# Patient Record
Sex: FEMALE | Race: WHITE | NOT HISPANIC OR LATINO | Employment: FULL TIME | URBAN - METROPOLITAN AREA
[De-identification: names, ages, dates, MRNs, and addresses within clinical notes are randomized per-mention and may not be internally consistent; named-entity substitution may affect disease eponyms.]

---

## 2019-09-10 ENCOUNTER — TRANSCRIBE ORDERS (OUTPATIENT)
Dept: ADMINISTRATIVE | Facility: HOSPITAL | Age: 44
End: 2019-09-10

## 2019-09-10 DIAGNOSIS — Z12.39 BREAST SCREENING: ICD-10-CM

## 2019-09-10 DIAGNOSIS — Z12.39 BREAST SCREENING, UNSPECIFIED: Primary | ICD-10-CM

## 2019-10-14 PROCEDURE — 88305 TISSUE EXAM BY PATHOLOGIST: CPT | Performed by: PATHOLOGY

## 2019-10-16 ENCOUNTER — LAB REQUISITION (OUTPATIENT)
Dept: LAB | Facility: HOSPITAL | Age: 44
End: 2019-10-16
Payer: COMMERCIAL

## 2019-10-16 DIAGNOSIS — N92.1 EXCESSIVE AND FREQUENT MENSTRUATION WITH IRREGULAR CYCLE: ICD-10-CM

## 2019-10-31 PROCEDURE — NC001 PR NO CHARGE: Performed by: OBSTETRICS & GYNECOLOGY

## 2019-11-02 ENCOUNTER — HOSPITAL ENCOUNTER (OUTPATIENT)
Dept: MAMMOGRAPHY | Facility: MEDICAL CENTER | Age: 44
Discharge: HOME | End: 2019-11-02
Payer: COMMERCIAL

## 2019-11-02 VITALS — HEIGHT: 66 IN | WEIGHT: 151 LBS | BODY MASS INDEX: 24.27 KG/M2

## 2019-11-02 DIAGNOSIS — Z12.31 VISIT FOR SCREENING MAMMOGRAM: ICD-10-CM

## 2019-11-02 DIAGNOSIS — Z12.39 BREAST SCREENING: ICD-10-CM

## 2019-11-02 PROCEDURE — 77067 SCR MAMMO BI INCL CAD: CPT

## 2019-11-02 PROCEDURE — 77063 BREAST TOMOSYNTHESIS BI: CPT

## 2019-11-04 RX ORDER — MONTELUKAST SODIUM 10 MG/1
10 TABLET ORAL DAILY
COMMUNITY

## 2019-11-04 RX ORDER — ALBUTEROL SULFATE 90 UG/1
2 AEROSOL, METERED RESPIRATORY (INHALATION) EVERY 6 HOURS PRN
COMMUNITY

## 2019-11-04 RX ORDER — CETIRIZINE HYDROCHLORIDE 10 MG/1
10 TABLET ORAL DAILY
COMMUNITY

## 2019-11-04 RX ORDER — IBUPROFEN 200 MG
200-800 TABLET ORAL EVERY 6 HOURS PRN
COMMUNITY

## 2019-11-04 NOTE — PRE-PROCEDURE INSTRUCTIONS
Pre-Surgery Instructions:   Medication Instructions    albuterol (PROVENTIL HFA,VENTOLIN HFA) 90 mcg/act inhaler Instructed patient per Anesthesia Guidelines   cetirizine (ZyrTEC) 10 mg tablet Instructed patient per Anesthesia Guidelines   ibuprofen (MOTRIN) 200 mg tablet Patient was instructed by Physician and understands   montelukast (SINGULAIR) 10 mg tablet Patient was instructed by Physician and understands  Instructed to use albuterol inhaler am of surgery per anesthesia

## 2019-11-06 ENCOUNTER — ANESTHESIA EVENT (OUTPATIENT)
Dept: PERIOP | Facility: HOSPITAL | Age: 44
End: 2019-11-06
Payer: COMMERCIAL

## 2019-11-06 RX ORDER — SODIUM CHLORIDE, SODIUM LACTATE, POTASSIUM CHLORIDE, CALCIUM CHLORIDE 600; 310; 30; 20 MG/100ML; MG/100ML; MG/100ML; MG/100ML
125 INJECTION, SOLUTION INTRAVENOUS CONTINUOUS
Status: CANCELLED | OUTPATIENT
Start: 2019-11-07

## 2019-11-07 ENCOUNTER — HOSPITAL ENCOUNTER (OUTPATIENT)
Facility: HOSPITAL | Age: 44
Setting detail: OUTPATIENT SURGERY
Discharge: HOME | End: 2019-11-07
Attending: OBSTETRICS & GYNECOLOGY | Admitting: OBSTETRICS & GYNECOLOGY
Payer: COMMERCIAL

## 2019-11-07 ENCOUNTER — ANESTHESIA (OUTPATIENT)
Dept: PERIOP | Facility: HOSPITAL | Age: 44
End: 2019-11-07
Payer: COMMERCIAL

## 2019-11-07 VITALS
WEIGHT: 151 LBS | BODY MASS INDEX: 24.27 KG/M2 | HEART RATE: 68 BPM | HEIGHT: 66 IN | DIASTOLIC BLOOD PRESSURE: 58 MMHG | SYSTOLIC BLOOD PRESSURE: 101 MMHG | OXYGEN SATURATION: 96 % | TEMPERATURE: 97.6 F | RESPIRATION RATE: 18 BRPM

## 2019-11-07 DIAGNOSIS — Z98.890 STATUS POST HYSTEROSCOPY: Primary | ICD-10-CM

## 2019-11-07 DIAGNOSIS — N92.1 EXCESSIVE AND FREQUENT MENSTRUATION WITH IRREGULAR CYCLE: ICD-10-CM

## 2019-11-07 DIAGNOSIS — N84.0 POLYP OF CORPUS UTERI: ICD-10-CM

## 2019-11-07 LAB
BASOPHILS # BLD AUTO: 0.03 THOUSANDS/ΜL (ref 0–0.1)
BASOPHILS NFR BLD AUTO: 1 % (ref 0–1)
EOSINOPHIL # BLD AUTO: 0.08 THOUSAND/ΜL (ref 0–0.61)
EOSINOPHIL NFR BLD AUTO: 1 % (ref 0–6)
ERYTHROCYTE [DISTWIDTH] IN BLOOD BY AUTOMATED COUNT: 12.7 % (ref 11.6–15.1)
EXT PREGNANCY TEST URINE: NEGATIVE
EXT. CONTROL: NORMAL
HCT VFR BLD AUTO: 35.9 % (ref 34.8–46.1)
HGB BLD-MCNC: 11.9 G/DL (ref 11.5–15.4)
IMM GRANULOCYTES # BLD AUTO: 0.02 THOUSAND/UL (ref 0–0.2)
IMM GRANULOCYTES NFR BLD AUTO: 0 % (ref 0–2)
LYMPHOCYTES # BLD AUTO: 1.33 THOUSANDS/ΜL (ref 0.6–4.47)
LYMPHOCYTES NFR BLD AUTO: 24 % (ref 14–44)
MCH RBC QN AUTO: 32 PG (ref 26.8–34.3)
MCHC RBC AUTO-ENTMCNC: 33.1 G/DL (ref 31.4–37.4)
MCV RBC AUTO: 97 FL (ref 82–98)
MONOCYTES # BLD AUTO: 0.4 THOUSAND/ΜL (ref 0.17–1.22)
MONOCYTES NFR BLD AUTO: 7 % (ref 4–12)
NEUTROPHILS # BLD AUTO: 3.78 THOUSANDS/ΜL (ref 1.85–7.62)
NEUTS SEG NFR BLD AUTO: 67 % (ref 43–75)
NRBC BLD AUTO-RTO: 0 /100 WBCS
PLATELET # BLD AUTO: 296 THOUSANDS/UL (ref 149–390)
PMV BLD AUTO: 9.7 FL (ref 8.9–12.7)
RBC # BLD AUTO: 3.72 MILLION/UL (ref 3.81–5.12)
WBC # BLD AUTO: 5.64 THOUSAND/UL (ref 4.31–10.16)

## 2019-11-07 PROCEDURE — NC001 PR NO CHARGE: Performed by: OBSTETRICS & GYNECOLOGY

## 2019-11-07 PROCEDURE — 81025 URINE PREGNANCY TEST: CPT | Performed by: OBSTETRICS & GYNECOLOGY

## 2019-11-07 PROCEDURE — 88305 TISSUE EXAM BY PATHOLOGIST: CPT | Performed by: PATHOLOGY

## 2019-11-07 PROCEDURE — 85025 COMPLETE CBC W/AUTO DIFF WBC: CPT | Performed by: OBSTETRICS & GYNECOLOGY

## 2019-11-07 RX ORDER — MEPERIDINE HYDROCHLORIDE 50 MG/ML
12.5 INJECTION INTRAMUSCULAR; INTRAVENOUS; SUBCUTANEOUS ONCE AS NEEDED
Status: DISCONTINUED | OUTPATIENT
Start: 2019-11-07 | End: 2019-11-07 | Stop reason: HOSPADM

## 2019-11-07 RX ORDER — ACETAMINOPHEN 325 MG/1
650 TABLET ORAL EVERY 6 HOURS PRN
Status: DISCONTINUED | OUTPATIENT
Start: 2019-11-07 | End: 2019-11-07 | Stop reason: HOSPADM

## 2019-11-07 RX ORDER — DEXAMETHASONE SODIUM PHOSPHATE 4 MG/ML
INJECTION, SOLUTION INTRA-ARTICULAR; INTRALESIONAL; INTRAMUSCULAR; INTRAVENOUS; SOFT TISSUE AS NEEDED
Status: DISCONTINUED | OUTPATIENT
Start: 2019-11-07 | End: 2019-11-07 | Stop reason: SURG

## 2019-11-07 RX ORDER — IBUPROFEN 600 MG/1
600 TABLET ORAL EVERY 6 HOURS PRN
Status: DISCONTINUED | OUTPATIENT
Start: 2019-11-07 | End: 2019-11-07 | Stop reason: HOSPADM

## 2019-11-07 RX ORDER — IBUPROFEN 600 MG/1
600 TABLET ORAL EVERY 6 HOURS PRN
Qty: 30 TABLET | Refills: 0
Start: 2019-11-07

## 2019-11-07 RX ORDER — FENTANYL CITRATE/PF 50 MCG/ML
50 SYRINGE (ML) INJECTION
Status: DISCONTINUED | OUTPATIENT
Start: 2019-11-07 | End: 2019-11-07 | Stop reason: HOSPADM

## 2019-11-07 RX ORDER — MAGNESIUM HYDROXIDE 1200 MG/15ML
LIQUID ORAL AS NEEDED
Status: DISCONTINUED | OUTPATIENT
Start: 2019-11-07 | End: 2019-11-07 | Stop reason: HOSPADM

## 2019-11-07 RX ORDER — HYDROMORPHONE HCL/PF 1 MG/ML
0.5 SYRINGE (ML) INJECTION
Status: DISCONTINUED | OUTPATIENT
Start: 2019-11-07 | End: 2019-11-07 | Stop reason: HOSPADM

## 2019-11-07 RX ORDER — ALBUTEROL SULFATE 90 UG/1
2 AEROSOL, METERED RESPIRATORY (INHALATION) EVERY 6 HOURS PRN
Status: DISCONTINUED | OUTPATIENT
Start: 2019-11-07 | End: 2019-11-07 | Stop reason: HOSPADM

## 2019-11-07 RX ORDER — MIDAZOLAM HYDROCHLORIDE 2 MG/2ML
INJECTION, SOLUTION INTRAMUSCULAR; INTRAVENOUS AS NEEDED
Status: DISCONTINUED | OUTPATIENT
Start: 2019-11-07 | End: 2019-11-07 | Stop reason: SURG

## 2019-11-07 RX ORDER — ONDANSETRON 2 MG/ML
4 INJECTION INTRAMUSCULAR; INTRAVENOUS ONCE AS NEEDED
Status: DISCONTINUED | OUTPATIENT
Start: 2019-11-07 | End: 2019-11-07 | Stop reason: HOSPADM

## 2019-11-07 RX ORDER — SODIUM CHLORIDE 9 MG/ML
125 INJECTION, SOLUTION INTRAVENOUS CONTINUOUS
Status: DISCONTINUED | OUTPATIENT
Start: 2019-11-07 | End: 2019-11-07 | Stop reason: HOSPADM

## 2019-11-07 RX ORDER — ACETAMINOPHEN 500 MG
500 TABLET ORAL EVERY 6 HOURS PRN
Qty: 30 TABLET | Refills: 0
Start: 2019-11-07

## 2019-11-07 RX ORDER — ONDANSETRON 2 MG/ML
INJECTION INTRAMUSCULAR; INTRAVENOUS AS NEEDED
Status: DISCONTINUED | OUTPATIENT
Start: 2019-11-07 | End: 2019-11-07 | Stop reason: SURG

## 2019-11-07 RX ORDER — EPHEDRINE SULFATE 50 MG/ML
INJECTION INTRAVENOUS AS NEEDED
Status: DISCONTINUED | OUTPATIENT
Start: 2019-11-07 | End: 2019-11-07 | Stop reason: SURG

## 2019-11-07 RX ORDER — PROPOFOL 10 MG/ML
INJECTION, EMULSION INTRAVENOUS AS NEEDED
Status: DISCONTINUED | OUTPATIENT
Start: 2019-11-07 | End: 2019-11-07 | Stop reason: SURG

## 2019-11-07 RX ORDER — FENTANYL CITRATE 50 UG/ML
INJECTION, SOLUTION INTRAMUSCULAR; INTRAVENOUS AS NEEDED
Status: DISCONTINUED | OUTPATIENT
Start: 2019-11-07 | End: 2019-11-07 | Stop reason: SURG

## 2019-11-07 RX ADMIN — LIDOCAINE HYDROCHLORIDE 50 MG: 20 INJECTION, SOLUTION INTRAVENOUS at 11:00

## 2019-11-07 RX ADMIN — FENTANYL CITRATE 25 MCG: 50 INJECTION, SOLUTION INTRAMUSCULAR; INTRAVENOUS at 11:18

## 2019-11-07 RX ADMIN — SODIUM CHLORIDE 125 ML/HR: 0.9 INJECTION, SOLUTION INTRAVENOUS at 10:35

## 2019-11-07 RX ADMIN — PROPOFOL 200 MG: 10 INJECTION, EMULSION INTRAVENOUS at 11:00

## 2019-11-07 RX ADMIN — MIDAZOLAM 2 MG: 1 INJECTION INTRAMUSCULAR; INTRAVENOUS at 10:49

## 2019-11-07 RX ADMIN — SODIUM CHLORIDE 125 ML/HR: 0.9 INJECTION, SOLUTION INTRAVENOUS at 12:05

## 2019-11-07 RX ADMIN — ONDANSETRON 4 MG: 2 INJECTION INTRAMUSCULAR; INTRAVENOUS at 11:02

## 2019-11-07 RX ADMIN — DEXAMETHASONE SODIUM PHOSPHATE 4 MG: 4 INJECTION, SOLUTION INTRAMUSCULAR; INTRAVENOUS at 11:02

## 2019-11-07 RX ADMIN — FENTANYL CITRATE 25 MCG: 50 INJECTION, SOLUTION INTRAMUSCULAR; INTRAVENOUS at 11:02

## 2019-11-07 RX ADMIN — EPHEDRINE SULFATE 10 MG: 50 INJECTION, SOLUTION INTRAVENOUS at 11:16

## 2019-11-07 NOTE — INTERVAL H&P NOTE
H&P reviewed  After examining the patient I find no changes in the patients condition since the H&P had been written      Vitals:    11/07/19 1012   BP: 110/60   Pulse: 69   Resp: 16   Temp: 98 4 °F (36 9 °C)   SpO2: 98%

## 2019-11-07 NOTE — ANESTHESIA PREPROCEDURE EVALUATION
Review of Systems/Medical History  Patient summary reviewed  Chart reviewed  No history of anesthetic complications     Cardiovascular  Negative cardio ROS    Pulmonary  Asthma , seasonal/exercise induced Asthma type of rescue: PRN inhaler,        GI/Hepatic  Negative GI/hepatic ROS          Negative  ROS        Endo/Other  Negative endo/other ROS      GYN  Negative gynecology ROS          Hematology  Negative hematology ROS      Musculoskeletal  Negative musculoskeletal ROS        Neurology  Negative neurology ROS      Psychology   Negative psychology ROS              Physical Exam    Airway    Mallampati score: I  TM Distance: >3 FB  Neck ROM: full     Dental   Comment: Molar caps,     Cardiovascular  Comment: Negative ROS, Rhythm: regular, Rate: normal, Cardiovascular exam normal    Pulmonary  Pulmonary exam normal Breath sounds clear to auscultation,     Other Findings        Anesthesia Plan  ASA Score- 2     Anesthesia Type- general with ASA Monitors  Additional Monitors:   Airway Plan: LMA  Comment: Patient reports waking up during anal fistulotomy: explained TIVA to her and why she might have woken up  Plan Factors-    Induction- intravenous  Postoperative Plan-     Informed Consent- Anesthetic plan and risks discussed with patient

## 2019-11-07 NOTE — OP NOTE
OPERATIVE REPORT  PATIENT NAME: Eze Herron    :  1975  MRN: [id number]  Pt Location: [facility] 02    SURGERY DATE: 2019    Surgeon(s) and Role:     * Lilliam Coyle,  - Primary     * Brittany Hollingsworth MD - Observing    Preop Diagnosis:  Excessive and frequent menstruation with irregular cycle [N92 1]  Polyp of corpus uteri [N84 0]    Post-Op Diagnosis Codes:     * Excessive and frequent menstruation with irregular cycle [N92 1]     * Polyp of corpus uteri [N84 0]    Procedure(s) (LRB):  D&C, HYSTEROSCOPY W/ RESECTION (N/A)  POLYPECTOMY (N/A)    Specimen(s):  ID Type Source Tests Collected by Time Destination   1 : [facility] w/POLYP Tissue Endometrium TISSUE EXAM Lilliam Coyle DO 2019 1121        Estimated Blood Loss:   20 mL    Hysteroscopy deficit: 230 mL    Anesthesia Type:   General endotracheal    Operative Indications:  Excessive and frequent menstruation with irregular cycle [N92 1]  Polyp of corpus uteri [N84 0]    Operative Findings:  1  A bimanual exam patient had a anteverted mobile uterus with no adnexal masses or fullness palpated  2  Uterus sounded to 8 5 cm  3  On hysteroscopy structurally normal uterus was noted with a 1-2 cm polyp arising from the left anterior fundus also connected to the mid- posterior fundus  4  Bilateral ostia visualized    Complications:   None    Brief History  61-year-old  with abnormal uterine bleeding for D&C hysteroscopy  All risks, benefits, and alternatives to the procedure were discussed with the patient and she had the opportunity to ask questions  Informed consent was obtained  Description of Procedure    Patient was taken to the operating room where correct patient and correct procedure were confirmed  General endotracheal anesthesia (GET) was administered and the patient was positioned on the OR table in the dorsal lithotomy position   All pressure points were padded and a licha hugger was placed to maintain control of core body temperature  A bimanual exam was performed and the uterus was noted to be anteverted, normal in size and consistency with no palpable adnexal masses or fullness  The patient was prepped and draped in the usual sterile fashion  A time out was performed per protocol  Operative Technique    A straight catheter was introduced into the bladder, which was drained of 50cc of clear yellow urine  A weighted speculum was inserted into the vagina and a Andre retractor was used to visualize the anterior lip of the cervix, which was then grasped with a single toothed tenaculum  The uterus was sounded to 8 5 cm  The cervix was serially dilated to 16 using Hanks dilators for introduction of the hysteroscope  Hysteroscope was introduced under direct visualization using normal saline solution as the distention media  Hysteroscope was advanced to the uterine fundus and the entire uterine cavity was inspected in a systematic manner  There was noted to be a 1-2 cm uterine polyp doubly connected from the anterior left fundus to the posterior uterus  The MyoSure device was inserted and the polyp was transected and tissue was sent for pathological evaluation  Hysteroscope was withdrawn and sharp curetting was performed, starting at the 12'oclock position and rotating a total of 360 degrees to cover all surfaces  Endometrial tissue was obtained and sent for pathology  The single toothed tenaculum was removed from the anterior lip of the cervix  Good hemostasis was confirmed at the tenaculum puncture sites  Weighted speculum was then removed from the vagina  At the conclusion of the procedure, all needle, sponge, and instrument counts were noted to be correct x2  Patient tolerated the procedure well and was transferred to PACU in stable condition prior to discharge with follow up in 1-2 weeks  Dr Hanna Corona performed all ayers portions of the case      Patient Disposition:  PACU     SIGNATURE: Santo Acevedo MD  DATE: November 7, 2019  TIME: 11:35 AM     Attending Physician/Surgeon Statement  I was present for and participated in all key surgical aspects of this patient's care  I agree with the resident's documentation as stated above      Lilliam Coyle DO  11/07/19  4:16 PM

## 2019-11-07 NOTE — DISCHARGE INSTRUCTIONS
Hysteroscopy   WHAT YOU NEED TO KNOW:   A hysteroscopy is a procedure to find and treat problems in your uterus  A hysteroscopy may be done to find, and possibly treat, the cause of abnormal vaginal bleeding, problems getting pregnant, or miscarriage  It may also be done to insert or remove a device that prevents pregnancy  DISCHARGE INSTRUCTIONS:   Call 911 if:   · You have trouble breathing  Seek care immediately if:   · You have heavy vaginal bleeding that fills 1 or more sanitary pads in 1 hour  · You have severe pain or bloating in your abdomen  · You feel lightheaded, weak, and confused  · You see blood in your urine  · You stop urinating or urinate less than usual   Contact your healthcare provider if:   · You have a fever or chills  · You have pus or a foul-smelling odor coming from your vagina  · You see blood clots on your sanitary pad that are larger than the size of a quarter  · You have nausea or are vomiting  · Your skin is itchy, swollen, or you have a rash  · You have questions or concerns about your condition or care  Medicines: You may need any of the following:  · Estrogen  helps heal the lining of your uterus  · Antibiotics  help prevent a bacterial infection  · Prescription pain medicine  may be given  Ask your healthcare provider how to take this medicine safely  Some prescription pain medicines contain acetaminophen  Do not take other medicines that contain acetaminophen without talking to your healthcare provider  Too much acetaminophen may cause liver damage  Prescription pain medicine may cause constipation  Ask your healthcare provider how to prevent or treat constipation  · NSAIDs , such as ibuprofen, help decrease swelling, pain, and fever  NSAIDs can cause stomach bleeding or kidney problems in certain people  If you take blood thinner medicine, always ask your healthcare provider if NSAIDs are safe for you   Always read the medicine label and follow directions  · Take your medicine as directed  Contact your healthcare provider if you think your medicine is not helping or if you have side effects  Tell him or her if you are allergic to any medicine  Keep a list of the medicines, vitamins, and herbs you take  Include the amounts, and when and why you take them  Bring the list or the pill bottles to follow-up visits  Carry your medicine list with you in case of an emergency  Self-care:  Do not have sex, use tampons, or douche for 6 weeks  These actions may cause an infection  Ask your healthcare provider if it is okay to take a tub bath or swim  Rest as needed  Do not drive, return to work, or exercise for 24 hours or as directed  You can return to most activities in 1 to 2 days  Follow up with your healthcare provider as directed:  Write down your questions so you remember to ask them during your visits  © 2017 [employer] Information is for End User's use only and may not be sold, redistributed or otherwise used for commercial purposes  All illustrations and images included in CareNotes® are the copyrighted property of A D A M , Inc  or Kenneth [employer]  The above information is an  only  It is not intended as medical advice for individual conditions or treatments  Talk to your doctor, nurse or pharmacist before following any medical regimen to see if it is safe and effective for you  Dilation and Curettage   WHAT YOU SHOULD KNOW:   Dilation and curettage (D and C) is a procedure to remove tissue from the lining of your uterus  AFTER YOU LEAVE:   Medicines:   · Pain medicine: You may be given medicine to take away or decrease pain  Do not wait until the pain is severe before you take your medicine  · Antibiotics: This medicine will help fight or prevent an infection  · Take your medicine as directed    Call your healthcare provider if you think your medicine is not helping or if you have side effects  Tell him if you are allergic to any medicine  Keep a list of the medicines, vitamins, and herbs you take  Include the amounts, and when and why you take them  Bring the list or the pill bottles to follow-up visits  Carry your medicine list with you in case of an emergency  Follow up with your healthcare provider as directed:  Write down your questions so you remember to ask them during your visits  Activity:  Ask your primary healthcare provider when you can return to your normal activities  Contact your primary healthcare provider if:   · You have foul-smelling vaginal discharge  · You do not get your monthly period  · You feel depressed or anxious  · You feel very tired and weak  · You have questions or concerns about your condition or care  Seek care immediately or call 911 if:   · You have heavy vaginal bleeding that soaks 1 pad in 1 hour for 2 hours in a row  · You have a fever and abdominal cramps  · Your pain does not get better, even after treatment  © 2014 [address] Kathy Prasad is for End User's use only and may not be sold, redistributed or otherwise used for commercial purposes  All illustrations and images included in CareNotes® are the copyrighted property of A D [employer] , [employer]  or Kenneth [employer]  The above information is an  only  It is not intended as medical advice for individual conditions or treatments  Talk to your doctor, nurse or pharmacist before following any medical regimen to see if it is safe and effective for you

## 2020-10-07 ENCOUNTER — TRANSCRIBE ORDERS (OUTPATIENT)
Dept: ADMINISTRATIVE | Facility: HOSPITAL | Age: 45
End: 2020-10-07

## 2020-10-07 DIAGNOSIS — Z12.31 ENCOUNTER FOR SCREENING MAMMOGRAM FOR MALIGNANT NEOPLASM OF BREAST: Primary | ICD-10-CM

## 2021-01-16 ENCOUNTER — HOSPITAL ENCOUNTER (OUTPATIENT)
Dept: MAMMOGRAPHY | Facility: MEDICAL CENTER | Age: 46
Discharge: HOME | End: 2021-01-16
Payer: COMMERCIAL

## 2021-01-16 VITALS — BODY MASS INDEX: 24.27 KG/M2 | HEIGHT: 66 IN | WEIGHT: 151 LBS

## 2021-01-16 DIAGNOSIS — Z12.31 ENCOUNTER FOR SCREENING MAMMOGRAM FOR MALIGNANT NEOPLASM OF BREAST: ICD-10-CM

## 2021-01-16 PROCEDURE — 77067 SCR MAMMO BI INCL CAD: CPT

## 2021-01-16 PROCEDURE — 77063 BREAST TOMOSYNTHESIS BI: CPT

## 2021-12-26 ENCOUNTER — OFFICE VISIT (OUTPATIENT)
Dept: URGENT CARE | Age: 46
End: 2021-12-26
Payer: COMMERCIAL

## 2021-12-26 VITALS
HEART RATE: 99 BPM | WEIGHT: 155 LBS | BODY MASS INDEX: 25.83 KG/M2 | TEMPERATURE: 98.1 F | OXYGEN SATURATION: 97 % | RESPIRATION RATE: 20 BRPM | HEIGHT: 65 IN

## 2021-12-26 DIAGNOSIS — B34.9 VIRAL SYNDROME: Primary | ICD-10-CM

## 2021-12-26 PROCEDURE — 87636 SARSCOV2 & INF A&B AMP PRB: CPT

## 2021-12-26 PROCEDURE — 99213 OFFICE O/P EST LOW 20 MIN: CPT

## 2021-12-27 LAB
FLUAV RNA RESP QL NAA+PROBE: NEGATIVE
FLUBV RNA RESP QL NAA+PROBE: NEGATIVE
SARS-COV-2 RNA RESP QL NAA+PROBE: POSITIVE

## 2022-01-22 ENCOUNTER — HOSPITAL ENCOUNTER (OUTPATIENT)
Dept: MAMMOGRAPHY | Facility: MEDICAL CENTER | Age: 47
Discharge: HOME | End: 2022-01-22
Payer: COMMERCIAL

## 2022-01-22 VITALS — HEIGHT: 65 IN | WEIGHT: 155 LBS | BODY MASS INDEX: 25.83 KG/M2

## 2022-01-22 DIAGNOSIS — Z12.31 ENCOUNTER FOR SCREENING MAMMOGRAM FOR MALIGNANT NEOPLASM OF BREAST: ICD-10-CM

## 2022-01-22 PROCEDURE — 77063 BREAST TOMOSYNTHESIS BI: CPT

## 2022-01-22 PROCEDURE — 77067 SCR MAMMO BI INCL CAD: CPT

## 2023-06-13 ENCOUNTER — HOSPITAL ENCOUNTER (OUTPATIENT)
Dept: RADIOLOGY | Facility: IMAGING CENTER | Age: 48
Discharge: HOME | End: 2023-06-13
Payer: COMMERCIAL

## 2023-06-13 VITALS — BODY MASS INDEX: 24.17 KG/M2 | WEIGHT: 145.06 LBS | HEIGHT: 65 IN

## 2023-06-13 DIAGNOSIS — Z12.31 SCREENING MAMMOGRAM FOR HIGH-RISK PATIENT: ICD-10-CM

## 2023-06-13 PROCEDURE — 77067 SCR MAMMO BI INCL CAD: CPT

## 2023-06-13 PROCEDURE — 77063 BREAST TOMOSYNTHESIS BI: CPT

## 2023-06-26 LAB — EXTERNAL COLOGUARD RESULT: NEGATIVE

## 2023-07-07 ENCOUNTER — HOSPITAL ENCOUNTER (OUTPATIENT)
Dept: ULTRASOUND IMAGING | Facility: CLINIC | Age: 48
Discharge: HOME | End: 2023-07-07
Payer: COMMERCIAL

## 2023-07-07 VITALS — BODY MASS INDEX: 24.16 KG/M2 | WEIGHT: 145 LBS | HEIGHT: 65 IN

## 2023-07-07 DIAGNOSIS — R92.2 INCONCLUSIVE MAMMOGRAM: ICD-10-CM

## 2023-07-07 PROCEDURE — 76641 ULTRASOUND BREAST COMPLETE: CPT

## 2023-07-11 ENCOUNTER — HOSPITAL ENCOUNTER (OUTPATIENT)
Dept: ULTRASOUND IMAGING | Facility: CLINIC | Age: 48
Discharge: HOME | End: 2023-07-11
Payer: COMMERCIAL

## 2023-07-11 DIAGNOSIS — R92.8 ABNORMAL ULTRASOUND OF BREAST: ICD-10-CM

## 2023-07-11 PROCEDURE — 76642 ULTRASOUND BREAST LIMITED: CPT

## 2023-09-05 ENCOUNTER — RA CDI HCC (OUTPATIENT)
Dept: OTHER | Facility: HOSPITAL | Age: 48
End: 2023-09-05

## 2023-09-05 NOTE — PROGRESS NOTES
[facility] [address] [facility] coding opportunities       Chart reviewed, no opportunity found: CHART REVIEWED, NO OPPORTUNITY FOUND        Patients Insurance        Commercial Insurance: Ruiz Collado

## 2023-09-11 ENCOUNTER — OFFICE VISIT (OUTPATIENT)
Age: 48
End: 2023-09-11
Payer: COMMERCIAL

## 2023-09-11 VITALS
TEMPERATURE: 98.2 F | SYSTOLIC BLOOD PRESSURE: 130 MMHG | OXYGEN SATURATION: 98 % | HEIGHT: 64 IN | HEART RATE: 67 BPM | BODY MASS INDEX: 25.47 KG/M2 | WEIGHT: 149.2 LBS | DIASTOLIC BLOOD PRESSURE: 78 MMHG

## 2023-09-11 DIAGNOSIS — Z76.89 ENCOUNTER TO ESTABLISH CARE: ICD-10-CM

## 2023-09-11 DIAGNOSIS — Z12.12 ENCOUNTER FOR COLORECTAL CANCER SCREENING: Primary | ICD-10-CM

## 2023-09-11 DIAGNOSIS — Z13.228 SCREENING FOR METABOLIC DISORDER: ICD-10-CM

## 2023-09-11 DIAGNOSIS — Z12.11 SCREENING FOR COLON CANCER: ICD-10-CM

## 2023-09-11 DIAGNOSIS — Z12.11 ENCOUNTER FOR COLORECTAL CANCER SCREENING: Primary | ICD-10-CM

## 2023-09-11 PROCEDURE — 99203 OFFICE O/P NEW LOW 30 MIN: CPT | Performed by: NURSE PRACTITIONER

## 2023-09-11 RX ORDER — LANOLIN ALCOHOL/MO/W.PET/CERES
1000 CREAM (GRAM) TOPICAL
COMMUNITY

## 2023-09-11 RX ORDER — MELATONIN
1000 DAILY
COMMUNITY

## 2023-09-11 NOTE — PROGRESS NOTES
Assessment/Plan:    Encounter to establish care  - Up-to-date with colon, cervical and breast cancer screening  -Fasting blood work completed in March 2023, repeat blood work in 1 year with annual physical  -Continue with well-balanced diet, continue daily exercise              Diagnoses and all orders for this visit:    Encounter for colorectal cancer screening  -     Ambulatory Referral to Gastroenterology; Future    Screening for colon cancer  -     Ambulatory Referral to Gastroenterology; Future    Screening for metabolic disorder  -     CBC and differential  -     Comprehensive metabolic panel; Future  -     Lipid panel    Encounter to establish care    Other orders  -     vitamin B-12 (VITAMIN B-12) 1,000 mcg tablet; Take 1,000 mcg by mouth  -     Omega 3-6-9 Fatty Acids (OMEGA 3-6-9 COMPLEX PO); Take by mouth in the morning  -     cholecalciferol (VITAMIN D3) 1,000 units tablet; Take 1,000 Units by mouth daily          Subjective:      Patient ID: Arturo Busch is a 41 y.o. female. Patient presents today to establish care with our practice. She denies any new concerns   She is a      She currently eats a plant based diet  She currently takes B12, vitamin D and fish oil     Exercise- walks every day, workouts out 3 times a week    Tobacco abuse- former smoker, quit in 2000  Alcohol abuse-social drinking     Mammogram- 2023   Cervical Ca screening-2019      The following portions of the patient's history were reviewed and updated as appropriate: allergies, current medications, past family history, past medical history, past social history, past surgical history and problem list.    Review of Systems   Constitutional: Negative for activity change, appetite change, chills, diaphoresis and fever. HENT: Negative for congestion, ear discharge, ear pain, postnasal drip, rhinorrhea, sinus pressure, sinus pain and sore throat. Eyes: Negative for pain, discharge, itching and visual disturbance. Respiratory: Negative for cough, chest tightness, shortness of breath and wheezing. Cardiovascular: Negative for chest pain, palpitations and leg swelling. Gastrointestinal: Negative for abdominal pain, constipation, diarrhea, nausea and vomiting. Endocrine: Negative for polydipsia, polyphagia and polyuria. Genitourinary: Negative for difficulty urinating, dysuria and urgency. Musculoskeletal: Negative for arthralgias, back pain and neck pain. Skin: Negative for rash and wound. Neurological: Negative for dizziness, weakness, numbness and headaches. Past Medical History:   Diagnosis Date   • Allergic    • Anesthesia complication     believes awoke during procedure   • Excessive and frequent menstruation    • Exercise-induced asthma    • Polyp of corpus uteri    • Post concussion syndrome     doing vision therapy   • Seasonal allergies          Current Outpatient Medications:   •  albuterol (PROVENTIL HFA,VENTOLIN HFA) 90 mcg/act inhaler, Inhale 2 puffs every 6 (six) hours as needed for wheezing, Disp: , Rfl:   •  cholecalciferol (VITAMIN D3) 1,000 units tablet, Take 1,000 Units by mouth daily, Disp: , Rfl:   •  ibuprofen (MOTRIN) 200 mg tablet, Take 200-800 mg by mouth every 6 (six) hours as needed for mild pain, Disp: , Rfl:   •  Omega 3-6-9 Fatty Acids (OMEGA 3-6-9 COMPLEX PO), Take by mouth in the morning, Disp: , Rfl:   •  vitamin B-12 (VITAMIN B-12) 1,000 mcg tablet, Take 1,000 mcg by mouth, Disp: , Rfl:     Allergies   Allergen Reactions   • Other Wheezing       Social History   Past Surgical History:   Procedure Laterality Date   • DILATION AND CURETTAGE OF UTERUS     • POLYPECTOMY N/A 11/7/2019    Procedure: POLYPECTOMY;  Surgeon: Lilliam Coyle DO;  Location: [facility];  Service: Gynecology   • MA HYSTEROSCOPY BX ENDOMETRIUM&/POLYPC W/WO D&C N/A 11/7/2019    Procedure: D&C, HYSTEROSCOPY W/ RESECTION;  Surgeon:  Lilliam Coyle DO;  Location: [facility];  Service: Gynecology • TREATMENT FISTULA ANAL     • WISDOM TOOTH EXTRACTION       Family History   Problem Relation Age of Onset   • No Known Problems Mother    • Lymphoma Father    • Brain cancer Maternal Grandmother 48   • No Known Problems Maternal Grandfather    • Breast cancer Paternal Grandmother 80   • Melanoma Paternal Grandfather    • No Known Problems Brother    • No Known Problems Son    • No Known Problems Son    • No Known Problems Maternal Aunt    • No Known Problems Maternal Aunt    • Brain cancer Paternal Aunt    • Breast cancer Paternal Aunt 64       Objective:  /78 (BP Location: Right arm, Patient Position: Sitting, Cuff Size: Standard)   Pulse 67   Temp 98.2 °F (36.8 °C) (Temporal)   Ht 5' 4.37" (1.635 m)   Wt 67.7 kg (149 lb 3.2 oz)   SpO2 98%   BMI 25.32 kg/m²     No results found for this or any previous visit (from the past 1344 hour(s)). Physical Exam  Constitutional:       General: She is not in acute distress. Appearance: She is well-developed. She is not diaphoretic. HENT:      Head: Normocephalic and atraumatic. Right Ear: External ear normal.      Left Ear: External ear normal.      Nose: Nose normal.      Mouth/Throat:      Pharynx: No oropharyngeal exudate. Eyes:      General:         Right eye: No discharge. Left eye: No discharge. Conjunctiva/sclera: Conjunctivae normal.      Pupils: Pupils are equal, round, and reactive to light. Neck:      Thyroid: No thyromegaly. Cardiovascular:      Rate and Rhythm: Normal rate and regular rhythm. Heart sounds: Normal heart sounds. No murmur heard. No friction rub. No gallop. Pulmonary:      Effort: Pulmonary effort is normal. No respiratory distress. Breath sounds: Normal breath sounds. No stridor. No wheezing or rales. Abdominal:      General: Bowel sounds are normal. There is no distension. Palpations: Abdomen is soft. Tenderness: There is no abdominal tenderness.    Musculoskeletal: Cervical back: Normal range of motion and neck supple. Lymphadenopathy:      Cervical: No cervical adenopathy. Skin:     General: Skin is warm and dry. Findings: No erythema or rash. Neurological:      Mental Status: She is alert and oriented to person, place, and time. Psychiatric:         Behavior: Behavior normal.         Thought Content:  Thought content normal.         Judgment: Judgment normal.

## 2023-09-11 NOTE — ASSESSMENT & PLAN NOTE
- Up-to-date with colon, cervical and breast cancer screening  -Fasting blood work completed in March 2023, repeat blood work in 1 year with annual physical  -Continue with well-balanced diet, continue daily exercise

## 2023-10-31 ENCOUNTER — AMB VIDEO VISIT (OUTPATIENT)
Dept: OTHER | Facility: HOSPITAL | Age: 48
End: 2023-10-31

## 2023-10-31 DIAGNOSIS — J06.9 ACUTE URI: Primary | ICD-10-CM

## 2023-10-31 PROBLEM — Z76.89 ENCOUNTER TO ESTABLISH CARE: Status: RESOLVED | Noted: 2023-09-11 | Resolved: 2023-10-31

## 2023-10-31 PROCEDURE — ECARE PR SL URGENT CARE VIRTUAL VISIT: Performed by: PHYSICIAN ASSISTANT

## 2023-10-31 RX ORDER — GUAIFENESIN, PSEUDOEPHEDRINE HYDROCHLORIDE 600; 60 MG/1; MG/1
1 TABLET, EXTENDED RELEASE ORAL EVERY 12 HOURS
Qty: 20 TABLET | Refills: 0 | Status: SHIPPED | OUTPATIENT
Start: 2023-10-31 | End: 2023-10-31 | Stop reason: SDUPTHER

## 2023-10-31 RX ORDER — METHYLPREDNISOLONE 4 MG/1
TABLET ORAL
Qty: 21 TABLET | Refills: 0 | Status: SHIPPED | OUTPATIENT
Start: 2023-10-31

## 2023-10-31 RX ORDER — GUAIFENESIN, PSEUDOEPHEDRINE HYDROCHLORIDE 600; 60 MG/1; MG/1
1 TABLET, EXTENDED RELEASE ORAL EVERY 12 HOURS
Qty: 20 TABLET | Refills: 0 | Status: SHIPPED | OUTPATIENT
Start: 2023-10-31

## 2023-10-31 RX ORDER — METHYLPREDNISOLONE 4 MG/1
TABLET ORAL
Qty: 21 TABLET | Refills: 0 | Status: SHIPPED | OUTPATIENT
Start: 2023-10-31 | End: 2023-10-31 | Stop reason: SDUPTHER

## 2023-10-31 NOTE — CARE ANYWHERE EVISITS
Visit Summary for Jordyn Herrera - Gender: Female - Date of Birth: [id number]  Date: [id number] - Duration: 18 minutes  Patient: Jordyn Herrera  Provider: Lizbeth Aguilar PA-C    Patient Contact Information  Address  [place] [facility] [address]; [place]  [phone]    Visit Topics  CongestiÃ³n moving to chest [Added By: Self - 2023-10-31]  Cold [Added By: Self - 2023-10-31]  Headache [Added By: Self - [id number]]    Triage Questions   What is your current physical address in the event of a medical emergency? Answer []  Are you allergic to any medications? Answer []  Are you now or could you be pregnant? Answer []  Do you have any immune system compromise or chronic lung   disease? Answer []  Do you have any vulnerable family members in the home (infant, pregnant, cancer, elderly)? Answer []     Conversation Transcripts  [0A][0A] [Notification] You are connected with Lizbeth Aguilar PA-C, Urgent Care Specialist.[0A][Notification] Raj Wright is located in [place]. [0A][Notification] Raj Wright has shared health history. Villasenor Redtawnya .[0A]    Diagnosis  Acute upper respiratory infection, unspecified    Procedures  Value: [id number] Code: CPT-4 UNLISTED E&M SERVICE    Medications Prescribed    Cyclafem 1/35 (28)      Frequency :             Electronically signed by: Quincy Hamman, Shannon(NPI [id number])

## 2023-10-31 NOTE — LETTER
October 31, 2023     Patient: Ania Main   YOB: 1975   Date of Visit: 10/31/2023       To Whom it May Concern: Ania Main is under my professional care. She was seen virtually on 10/31/2023. She may return to work on 11/2/23, or sooner if feeling well enough . If you have any questions or concerns, please don't hesitate to call.          Sincerely,          Cabrera Almaraz PA-C        CC: No Recipients

## 2023-10-31 NOTE — PATIENT INSTRUCTIONS
Schedule a follow-up appointment with your primary care physician for recheck in 2-3 days. If you cannot see your PCP, you can schedule a follow up appointment at a [facility]. Go to the emergency department if you develop any new or worsening symptoms including shortness of breath, chest pain, or anything else that is concerning. Excuses can be found in "Letters" section of [employer] cain. Can print if opened from a [address] phone number is [phone] if you need assistance or have further questions    1 21 ) STLUKES ([phone])  Schedule or Reschedule Outpatient Testing - Option 2  Billing - Option 3  General Info - Option 4  Defend Your [employer] Help - Option 5  Comprehensive Spine Program - Option 6   COVID - Option 7  Acute Bronchitis   WHAT YOU NEED TO KNOW:   Acute bronchitis is swelling and irritation in your lungs. It is usually caused by a virus and most often happens in the winter. Bronchitis may also be caused by bacteria or by a chemical irritant, such as smoke. DISCHARGE INSTRUCTIONS:   Return to the emergency department if:   You cough up blood. Your lips or fingernails turn blue. You feel like you are not getting enough air when you breathe. Call your doctor if:   Your symptoms do not go away or get worse, even after treatment. Your cough does not get better within 4 weeks. You have questions or concerns about your condition or care. Medicines: You may need any of the following:  Cough suppressants  decrease your urge to cough. Decongestants  help loosen mucus in your lungs and make it easier to cough up. This can help you breathe easier. Inhalers  may be given. Your healthcare provider may give you one or more inhalers to help you breathe easier and cough less. An inhaler gives you medicine to open your airways. Ask your healthcare provider to show you how to use your inhaler correctly.          Antiviral medicine  treats infections caused by a virus.    Antibiotics  may be given if your bronchitis is caused by bacteria or if you have lung condition. Acetaminophen  decreases pain and fever. It is available without a doctor's order. Ask how much to take and how often to take it. Follow directions. Read the labels of all other medicines you are using to see if they also contain acetaminophen, or ask your doctor or pharmacist. Acetaminophen can cause liver damage if not taken correctly. NSAIDs  help decrease swelling and pain or fever. This medicine is available with or without a doctor's order. NSAIDs can cause stomach bleeding or kidney problems in certain people. If you take blood thinner medicine, always ask your healthcare provider if NSAIDs are safe for you. Always read the medicine label and follow directions. Self-care:   Drink liquids as directed. You may need to drink more liquids than usual to stay hydrated. Ask how much liquid to drink each day and which liquids are best for you. Use a cool mist humidifier. This increases air moisture in your home. This may make it easier for you to breathe and help decrease your cough. Get more rest.  Rest helps your body to heal. Slowly start to do more each day. Rest when you feel it is needed. Prevent acute bronchitis:       Ask about vaccines you may need. Get a flu vaccine each year as soon as recommended, usually in September or October. Ask your healthcare provider if you should also get a pneumonia or COVID-19 vaccine. Your healthcare provider can tell you if you should also get other vaccines, and when to get them. Prevent the spread of germs. You can decrease your risk for acute bronchitis and other illnesses by doing the following:     Wash your hands often with soap and water. Carry germ-killing hand lotion or gel with you. You can use the lotion or gel to clean your hands when soap and water are not available.          Do not touch your eyes, nose, or mouth unless you have washed your hands first.    Always cover your mouth when you cough to prevent the spread of germs. It is best to cough into a tissue or your shirt sleeve instead of into your hand. Ask those around you to cover their mouths when they cough. Try to avoid people who have a cold or the flu. If you are sick, stay away from others as much as possible. Avoid irritants in the air. Avoid chemicals, fumes, and dust. Wear a face mask if you must work around dust or fumes. Stay inside on days when air pollution levels are high. If you have allergies, stay inside when pollen counts are high. Do not use aerosol products, such as spray-on deodorant, bug spray, and hair spray. Do not smoke or be around others who are smoking. Nicotine and other chemicals in cigarettes and cigars can cause lung damage. Ask your healthcare provider for information if you currently smoke and need help to quit. E-cigarettes or smokeless tobacco still contain nicotine. Talk to your healthcare provider before you use these products. Follow up with your doctor as directed:  Write down questions you have so you will remember to ask them during your follow-up visits. © Copyright Nohem[employer] Mar 2023 Information is for End User's use only and may not be sold, redistributed or otherwise used for commercial purposes. The above information is an  only. It is not intended as medical advice for individual conditions or treatments. Talk to your doctor, nurse or pharmacist before following any medical regimen to see if it is safe and effective for you.

## 2023-10-31 NOTE — PROGRESS NOTES
Video Visit - Arminda Baker 57 y.o. female MRN: [id number]    REQUIRED DOCUMENTATION:         1. This service was provided via G[employer]. 2. Provider located at [address] [place] [address] Road [address] [phone].  3. [employer] provider: Ari Arellano PA-C.  4. Identify all parties in room with patient during [facility] visit:  No one else  5. After connecting through [employer], patient was identified by name and date of birth. Patient was then informed that this was a Telemedicine visit and that the exam was being conducted confidentially over secure lines. My office door was closed. No one else was in the room. Patient acknowledged consent and understanding of privacy and security of the Telemedicine visit. I informed the patient that I have reviewed their record in Epic and presented the opportunity for them to ask any questions regarding the visit today. The patient agreed to participate. VITALS: Heart Rate: 80 BPM, Respiratory Rate: 16 RPM, Temperature Unavailable° F, Blood Pressure Unavailable mmHg, Pulse Ox Unavailable % on RA    HPI  Pt reports cold-like sx starting Friday- Sore throat, runny nose, sneezing, PND, cough is dry, HA, chest tightness and some wheezing. Called off of work today. Tried dyquil-DM, neti pot,  nikhil lemon honey drink, humidifier without relief. No SOB  Physical Exam  Constitutional:       General: She is not in acute distress. Appearance: Normal appearance. She is not ill-appearing or toxic-appearing. HENT:      Head: Normocephalic and atraumatic. Nose: No rhinorrhea. Mouth/Throat:      Mouth: Mucous membranes are moist.   Eyes:      Conjunctiva/sclera: Conjunctivae normal.   Cardiovascular:      Rate and Rhythm: Normal rate. Pulmonary:      Effort: Pulmonary effort is normal. No respiratory distress. Breath sounds: No wheezing (no gross audible wheeze through computer).    Musculoskeletal:      Cervical back: Normal range of motion. Skin:     Findings: No rash (on face or neck). Neurological:      Mental Status: She is alert. Cranial Nerves: No dysarthria or facial asymmetry. Psychiatric:         Mood and Affect: Mood normal.         Behavior: Behavior normal.         Diagnoses and all orders for this visit:    Acute URI  -     Discontinue: methylPREDNISolone 4 MG tablet therapy pack; Use as directed on package  -     Discontinue: pseudoephedrine-guaifenesin (MUCINEX D)  MG per tablet; Take 1 tablet by mouth every 12 (twelve) hours  -     methylPREDNISolone 4 MG tablet therapy pack; Use as directed on package  -     pseudoephedrine-guaifenesin (MUCINEX D)  MG per tablet; Take 1 tablet by mouth every 12 (twelve) hours      Patient Instructions   Schedule a follow-up appointment with your primary care physician for recheck in 2-3 days. If you cannot see your PCP, you can schedule a follow up appointment at a [facility]. Go to the emergency department if you develop any new or worsening symptoms including shortness of breath, chest pain, or anything else that is concerning. Excuses can be found in "Letters" section of [employer] cain. Can print if opened from a [employer] phone number is [phone] if you need assistance or have further questions    1 21 [phone]) [facility] ([phone])  Schedule or Reschedule Outpatient Testing - Option 2  Billing - Option 3  General Info - Option 4  [email or web address] Help - Option 5  Comprehensive Spine Program - Option 6   COVID - Option 7  Acute Bronchitis   WHAT YOU NEED TO KNOW:   Acute bronchitis is swelling and irritation in your lungs. It is usually caused by a virus and most often happens in the winter. Bronchitis may also be caused by bacteria or by a chemical irritant, such as smoke. DISCHARGE INSTRUCTIONS:   Return to the emergency department if:   You cough up blood. Your lips or fingernails turn blue.     You feel like you are not getting enough air when you breathe. Call your doctor if:   Your symptoms do not go away or get worse, even after treatment. Your cough does not get better within 4 weeks. You have questions or concerns about your condition or care. Medicines: You may need any of the following:  Cough suppressants  decrease your urge to cough. Decongestants  help loosen mucus in your lungs and make it easier to cough up. This can help you breathe easier. Inhalers  may be given. Your healthcare provider may give you one or more inhalers to help you breathe easier and cough less. An inhaler gives you medicine to open your airways. Ask your healthcare provider to show you how to use your inhaler correctly. Antiviral medicine  treats infections caused by a virus. Antibiotics  may be given if your bronchitis is caused by bacteria or if you have lung condition. Acetaminophen  decreases pain and fever. It is available without a doctor's order. Ask how much to take and how often to take it. Follow directions. Read the labels of all other medicines you are using to see if they also contain acetaminophen, or ask your doctor or pharmacist. Acetaminophen can cause liver damage if not taken correctly. NSAIDs  help decrease swelling and pain or fever. This medicine is available with or without a doctor's order. NSAIDs can cause stomach bleeding or kidney problems in certain people. If you take blood thinner medicine, always ask your healthcare provider if NSAIDs are safe for you. Always read the medicine label and follow directions. Self-care:   Drink liquids as directed. You may need to drink more liquids than usual to stay hydrated. Ask how much liquid to drink each day and which liquids are best for you. Use a cool mist humidifier. This increases air moisture in your home. This may make it easier for you to breathe and help decrease your cough.      Get more rest.  Rest helps your body to heal. Slowly start to do more each day. Rest when you feel it is needed. Prevent acute bronchitis:       Ask about vaccines you may need. Get a flu vaccine each year as soon as recommended, usually in September or October. Ask your healthcare provider if you should also get a pneumonia or COVID-19 vaccine. Your healthcare provider can tell you if you should also get other vaccines, and when to get them. Prevent the spread of germs. You can decrease your risk for acute bronchitis and other illnesses by doing the following:     Wash your hands often with soap and water. Carry germ-killing hand lotion or gel with you. You can use the lotion or gel to clean your hands when soap and water are not available. Do not touch your eyes, nose, or mouth unless you have washed your hands first.    Always cover your mouth when you cough to prevent the spread of germs. It is best to cough into a tissue or your shirt sleeve instead of into your hand. Ask those around you to cover their mouths when they cough. Try to avoid people who have a cold or the flu. If you are sick, stay away from others as much as possible. Avoid irritants in the air. Avoid chemicals, fumes, and dust. Wear a face mask if you must work around dust or fumes. Stay inside on days when air pollution levels are high. If you have allergies, stay inside when pollen counts are high. Do not use aerosol products, such as spray-on deodorant, bug spray, and hair spray. Do not smoke or be around others who are smoking. Nicotine and other chemicals in cigarettes and cigars can cause lung damage. Ask your healthcare provider for information if you currently smoke and need help to quit. E-cigarettes or smokeless tobacco still contain nicotine. Talk to your healthcare provider before you use these products. Follow up with your doctor as directed:  Write down questions you have so you will remember to ask them during your follow-up visits.   © Copyright [employer] 2023 Information is for End User's use only and may not be sold, redistributed or otherwise used for commercial purposes. The above information is an  only. It is not intended as medical advice for individual conditions or treatments. Talk to your doctor, nurse or pharmacist before following any medical regimen to see if it is safe and effective for you.

## 2023-12-22 ENCOUNTER — AMB VIDEO VISIT (OUTPATIENT)
Dept: OTHER | Facility: HOSPITAL | Age: 48
End: 2023-12-22

## 2023-12-22 VITALS — RESPIRATION RATE: 16 BRPM | TEMPERATURE: 99 F | HEART RATE: 83 BPM

## 2023-12-22 DIAGNOSIS — J20.9 ACUTE BRONCHITIS, UNSPECIFIED ORGANISM: Primary | ICD-10-CM

## 2023-12-22 PROCEDURE — ECARE PR SL URGENT CARE VIRTUAL VISIT: Performed by: PHYSICIAN ASSISTANT

## 2023-12-22 RX ORDER — METHYLPREDNISOLONE 4 MG/1
TABLET ORAL
Qty: 21 TABLET | Refills: 0 | Status: SHIPPED | OUTPATIENT
Start: 2023-12-22

## 2023-12-22 RX ORDER — BENZONATATE 200 MG/1
200 CAPSULE ORAL 3 TIMES DAILY PRN
Qty: 20 CAPSULE | Refills: 0 | Status: SHIPPED | OUTPATIENT
Start: 2023-12-22

## 2023-12-22 NOTE — PROGRESS NOTES
Required Documentation:  Encounter provider Shannon D Severino, PA-C    Provider located at [facility]  VIRTUAL CARE   [address] [facility] [address]    Identify all parties in room with patient during virtual visit:  No one else    The patient was identified by name and date of birth. Sharon Patel was informed that this is a telemedicine visit and that the visit is being conducted through the [employer]where [employer] platform. She agrees to proceed..  My office door was closed. No one else was in the room.  She acknowledged consent and understanding of privacy and security of the video platform. The patient has agreed to participate and understands they can discontinue the visit at any time.    Verification of patient location:    Patient is located at home in the following state in which I hold an active license [place]    Patient is aware this is a billable service.     Reason for visit is No chief complaint on file.       Subjective  HPI   Pt reports cols sx- Monday and Tuesday had dry cough. Wednesday started mucinex which broke up some of the mucous. Reports mucous is brown and bloody. Reports back and abs are sore from coughing. Using humidifier, albuterol with some relief. Endorses chest tightness is relieved with albuterol. COVID negative    Past Medical History:   Diagnosis Date    Allergic     Anesthesia complication     believes awoke during procedure    Excessive and frequent menstruation     Exercise-induced asthma     Polyp of corpus uteri     Post concussion syndrome     doing vision therapy    Seasonal allergies        Past Surgical History:   Procedure Laterality Date    DILATION AND CURETTAGE OF UTERUS      POLYPECTOMY N/A 11/7/2019    Procedure: POLYPECTOMY;  Surgeon: Lilliam Coyle DO;  Location: [facility];  Service: Gynecology    LA HYSTEROSCOPY BX ENDOMETRIUM&/POLYPC W/WO D&C N/A 11/7/2019    Procedure: D&C, HYSTEROSCOPY W/ RESECTION;  Surgeon:  Lilliam Coyle, ;  Location: [facility] OR;  Service: Gynecology    TREATMENT FISTULA ANAL      WISDOM TOOTH EXTRACTION          Allergies   Allergen Reactions    Other Wheezing     seasonal       Review of Systems    Video Exam    Vitals:    12/22/23 1416   Pulse: 83   Resp: 16   Temp: 99 °F (37.2 °C)       Physical Exam  Constitutional:       General: She is not in acute distress.     Appearance: Normal appearance. She is not toxic-appearing.   HENT:      Head: Normocephalic and atraumatic.      Nose: No rhinorrhea.      Mouth/Throat:      Mouth: Mucous membranes are moist.   Eyes:      Conjunctiva/sclera: Conjunctivae normal.      Comments: Glasses   Cardiovascular:      Rate and Rhythm: Normal rate.   Pulmonary:      Effort: Pulmonary effort is normal. No respiratory distress.      Breath sounds: No wheezing (no gross audible wheeze through computer).   Musculoskeletal:      Cervical back: Normal range of motion.   Skin:     Findings: No rash (on face or neck).   Neurological:      Mental Status: She is alert.      Cranial Nerves: No dysarthria or facial asymmetry.   Psychiatric:         Mood and Affect: Mood normal.         Behavior: Behavior normal.         Visit Time  Total Visit Duration: 18 minutes    Assessment/Plan:    Diagnoses and all orders for this visit:    Acute bronchitis, unspecified organism  -     methylPREDNISolone 4 MG tablet therapy pack; Use as directed on package  -     benzonatate (TESSALON) 200 MG capsule; Take 1 capsule (200 mg total) by mouth 3 (three) times a day as needed for cough  -     XR chest pa & lateral; Future        Patient Instructions   Schedule a follow-up appointment with your primary care physician for recheck in 2-3 days. If you cannot see your PCP, you can schedule a follow up appointment at a [facility]. Go to the emergency department if you develop any new or worsening symptoms including shortness of breath, chest pain, significant bloody cough, or  "anything else that is concerning.    Excuses can be found in \"Letters\" section of [employer] cain. Can print if opened from a web browser  [employer] phone number is [phone] if you need assistance or have further questions    [phone] ([phone]  Schedule or Reschedule Outpatient Testing - Option 2  Billing - Option 3  General Info - Option 4  [employer] Help - Option 5  Comprehensive Spine Program - Option 6   COVID - Option 7    Find an outpatient imaging/radiology site:  [email or web address]  Acute Bronchitis   WHAT YOU NEED TO KNOW:   Acute bronchitis is swelling and irritation in your lungs. It is usually caused by a virus and most often happens in the winter. Bronchitis may also be caused by bacteria or by a chemical irritant, such as smoke.  DISCHARGE INSTRUCTIONS:   Return to the emergency department if:   You cough up blood.    Your lips or fingernails turn blue.    You feel like you are not getting enough air when you breathe.    Call your doctor if:   Your symptoms do not go away or get worse, even after treatment.    Your cough does not get better within 4 weeks.    You have questions or concerns about your condition or care.    Medicines:  You may need any of the following:  Cough suppressants  decrease your urge to cough.    Decongestants  help loosen mucus in your lungs and make it easier to cough up. This can help you breathe easier.    Inhalers  may be given. Your healthcare provider may give you one or more inhalers to help you breathe easier and cough less. An inhaler gives you medicine to open your airways. Ask your healthcare provider to show you how to use your inhaler correctly.         Antiviral medicine  treats infections caused by a virus.    Antibiotics  may be given if your bronchitis is caused by bacteria or if you have lung condition.    Acetaminophen  decreases pain and fever. It is available without a doctor's order. Ask how much to take and how often to " take it. Follow directions. Read the labels of all other medicines you are using to see if they also contain acetaminophen, or ask your doctor or pharmacist. Acetaminophen can cause liver damage if not taken correctly.    NSAIDs  help decrease swelling and pain or fever. This medicine is available with or without a doctor's order. NSAIDs can cause stomach bleeding or kidney problems in certain people. If you take blood thinner medicine, always ask your healthcare provider if NSAIDs are safe for you. Always read the medicine label and follow directions.    Self-care:   Drink liquids as directed.  You may need to drink more liquids than usual to stay hydrated. Ask how much liquid to drink each day and which liquids are best for you.    Use a cool mist humidifier.  This increases air moisture in your home. This may make it easier for you to breathe and help decrease your cough.     Get more rest.  Rest helps your body to heal. Slowly start to do more each day. Rest when you feel it is needed.    Prevent acute bronchitis:       Ask about vaccines you may need.  Get a flu vaccine each year as soon as recommended, usually in September or October. Ask your healthcare provider if you should also get a pneumonia or COVID-19 vaccine. Your healthcare provider can tell you if you should also get other vaccines, and when to get them.    Prevent the spread of germs.  You can decrease your risk for acute bronchitis and other illnesses by doing the following:     Wash your hands often with soap and water. Carry germ-killing hand lotion or gel with you. You can use the lotion or gel to clean your hands when soap and water are not available.         Do not touch your eyes, nose, or mouth unless you have washed your hands first.    Always cover your mouth when you cough to prevent the spread of germs. It is best to cough into a tissue or your shirt sleeve instead of into your hand. Ask those around you to cover their mouths when they  cough.    Try to avoid people who have a cold or the flu. If you are sick, stay away from others as much as possible.    Avoid irritants in the air.  Avoid chemicals, fumes, and dust. Wear a face mask if you must work around dust or fumes. Stay inside on days when air pollution levels are high. If you have allergies, stay inside when pollen counts are high. Do not use aerosol products, such as spray-on deodorant, bug spray, and hair spray.    Do not smoke or be around others who are smoking.  Nicotine and other chemicals in cigarettes and cigars can cause lung damage. Ask your healthcare provider for information if you currently smoke and need help to quit. E-cigarettes or smokeless tobacco still contain nicotine. Talk to your healthcare provider before you use these products.  Follow up with your doctor as directed:  Write down questions you have so you will remember to ask them during your follow-up visits.  © Copyright [employer] 2023 Information is for End User's use only and may not be sold, redistributed or otherwise used for commercial purposes.  The above information is an  only. It is not intended as medical advice for individual conditions or treatments. Talk to your doctor, nurse or pharmacist before following any medical regimen to see if it is safe and effective for you.

## 2023-12-22 NOTE — PATIENT INSTRUCTIONS
"Schedule a follow-up appointment with your primary care physician for recheck in 2-3 days. If you cannot see your PCP, you can schedule a follow up appointment at a [facility]. Go to the emergency department if you develop any new or worsening symptoms including shortness of breath, chest pain, significant bloody cough, or anything else that is concerning.    Excuses can be found in \"Letters\" section of [address] [facility]ain. Can print if opened from a web browser  [facility] phone number is [phone] if you need assistance or have further questions    [phone]  Schedule or Reschedule Outpatient Testing - Option 2  Billing - Option 3  General Info - Option 4  [employer] Help - Option 5  Comprehensive Spine Program - Option 6   COVID - Option 7    Find an outpatient imaging/radiology site:  [email or web address]  Acute Bronchitis   WHAT YOU NEED TO KNOW:   Acute bronchitis is swelling and irritation in your lungs. It is usually caused by a virus and most often happens in the winter. Bronchitis may also be caused by bacteria or by a chemical irritant, such as smoke.  DISCHARGE INSTRUCTIONS:   Return to the emergency department if:   You cough up blood.    Your lips or fingernails turn blue.    You feel like you are not getting enough air when you breathe.    Call your doctor if:   Your symptoms do not go away or get worse, even after treatment.    Your cough does not get better within 4 weeks.    You have questions or concerns about your condition or care.    Medicines:  You may need any of the following:  Cough suppressants  decrease your urge to cough.    Decongestants  help loosen mucus in your lungs and make it easier to cough up. This can help you breathe easier.    Inhalers  may be given. Your healthcare provider may give you one or more inhalers to help you breathe easier and cough less. An inhaler gives you medicine to open your airways. Ask your healthcare provider to " show you how to use your inhaler correctly.         Antiviral medicine  treats infections caused by a virus.    Antibiotics  may be given if your bronchitis is caused by bacteria or if you have lung condition.    Acetaminophen  decreases pain and fever. It is available without a doctor's order. Ask how much to take and how often to take it. Follow directions. Read the labels of all other medicines you are using to see if they also contain acetaminophen, or ask your doctor or pharmacist. Acetaminophen can cause liver damage if not taken correctly.    NSAIDs  help decrease swelling and pain or fever. This medicine is available with or without a doctor's order. NSAIDs can cause stomach bleeding or kidney problems in certain people. If you take blood thinner medicine, always ask your healthcare provider if NSAIDs are safe for you. Always read the medicine label and follow directions.    Self-care:   Drink liquids as directed.  You may need to drink more liquids than usual to stay hydrated. Ask how much liquid to drink each day and which liquids are best for you.    Use a cool mist humidifier.  This increases air moisture in your home. This may make it easier for you to breathe and help decrease your cough.     Get more rest.  Rest helps your body to heal. Slowly start to do more each day. Rest when you feel it is needed.    Prevent acute bronchitis:       Ask about vaccines you may need.  Get a flu vaccine each year as soon as recommended, usually in September or October. Ask your healthcare provider if you should also get a pneumonia or COVID-19 vaccine. Your healthcare provider can tell you if you should also get other vaccines, and when to get them.    Prevent the spread of germs.  You can decrease your risk for acute bronchitis and other illnesses by doing the following:     Wash your hands often with soap and water. Carry germ-killing hand lotion or gel with you. You can use the lotion or gel to clean your hands  when soap and water are not available.         Do not touch your eyes, nose, or mouth unless you have washed your hands first.    Always cover your mouth when you cough to prevent the spread of germs. It is best to cough into a tissue or your shirt sleeve instead of into your hand. Ask those around you to cover their mouths when they cough.    Try to avoid people who have a cold or the flu. If you are sick, stay away from others as much as possible.    Avoid irritants in the air.  Avoid chemicals, fumes, and dust. Wear a face mask if you must work around dust or fumes. Stay inside on days when air pollution levels are high. If you have allergies, stay inside when pollen counts are high. Do not use aerosol products, such as spray-on deodorant, bug spray, and hair spray.    Do not smoke or be around others who are smoking.  Nicotine and other chemicals in cigarettes and cigars can cause lung damage. Ask your healthcare provider for information if you currently smoke and need help to quit. E-cigarettes or smokeless tobacco still contain nicotine. Talk to your healthcare provider before you use these products.  Follow up with your doctor as directed:  Write down questions you have so you will remember to ask them during your follow-up visits.  © Copyright [employer] 2023 Information is for End User's use only and may not be sold, redistributed or otherwise used for commercial purposes.  The above information is an  only. It is not intended as medical advice for individual conditions or treatments. Talk to your doctor, nurse or pharmacist before following any medical regimen to see if it is safe and effective for you.

## 2023-12-22 NOTE — LETTER
December 22, 2023     Patient: Sharon Patel   YOB: 1975   Date of Visit: 12/22/2023       To Whom it May Concern:    Sharon Patel is under my professional care. She was seen virtually on 12/22/2023. She may return to work on 12/26/23 .    If you have any questions or concerns, please don't hesitate to call.         Sincerely,          Shannon D Severino, PA-C        CC: No Recipients

## 2024-04-23 ENCOUNTER — VBI (OUTPATIENT)
Dept: ADMINISTRATIVE | Facility: OTHER | Age: 49
End: 2024-04-23

## 2024-06-14 ENCOUNTER — HOSPITAL ENCOUNTER (OUTPATIENT)
Dept: MAMMOGRAPHY | Facility: MEDICAL CENTER | Age: 49
Discharge: HOME | End: 2024-06-14
Payer: COMMERCIAL

## 2024-06-14 VITALS — WEIGHT: 145 LBS | HEIGHT: 64 IN | BODY MASS INDEX: 24.75 KG/M2

## 2024-06-14 DIAGNOSIS — Z12.31 SCREENING MAMMOGRAM FOR BREAST CANCER: ICD-10-CM

## 2024-06-14 DIAGNOSIS — Z12.31 SCREENING MAMMOGRAM FOR HIGH-RISK PATIENT: ICD-10-CM

## 2024-06-14 PROCEDURE — 77063 BREAST TOMOSYNTHESIS BI: CPT

## 2024-06-14 PROCEDURE — 77067 SCR MAMMO BI INCL CAD: CPT

## 2024-08-06 ENCOUNTER — CONSULT (OUTPATIENT)
Age: 49
End: 2024-08-06
Payer: COMMERCIAL

## 2024-08-06 ENCOUNTER — APPOINTMENT (OUTPATIENT)
Dept: LAB | Facility: IMAGING CENTER | Age: 49
End: 2024-08-06
Payer: COMMERCIAL

## 2024-08-06 VITALS
SYSTOLIC BLOOD PRESSURE: 122 MMHG | DIASTOLIC BLOOD PRESSURE: 68 MMHG | HEART RATE: 86 BPM | TEMPERATURE: 97.9 F | OXYGEN SATURATION: 99 %

## 2024-08-06 DIAGNOSIS — R10.13 DYSPEPSIA: ICD-10-CM

## 2024-08-06 DIAGNOSIS — R10.13 DYSPEPSIA: Primary | ICD-10-CM

## 2024-08-06 DIAGNOSIS — Z12.11 SCREENING FOR COLON CANCER: ICD-10-CM

## 2024-08-06 DIAGNOSIS — Z12.11 ENCOUNTER FOR COLORECTAL CANCER SCREENING: ICD-10-CM

## 2024-08-06 DIAGNOSIS — Z12.12 ENCOUNTER FOR COLORECTAL CANCER SCREENING: ICD-10-CM

## 2024-08-06 LAB
GLIADIN PEPTIDE IGA SER-ACNC: 1.7 U/ML
GLIADIN PEPTIDE IGA SER-ACNC: NEGATIVE
GLIADIN PEPTIDE IGG SER-ACNC: >250 U/ML
GLIADIN PEPTIDE IGG SER-ACNC: POSITIVE
IGA SERPL-MCNC: 87 MG/DL (ref 66–433)
TTG IGA SER-ACNC: 0.7 U/ML
TTG IGA SER-ACNC: NEGATIVE
TTG IGG SER-ACNC: <0.8 U/ML
TTG IGG SER-ACNC: NEGATIVE

## 2024-08-06 PROCEDURE — 82784 ASSAY IGA/IGD/IGG/IGM EACH: CPT

## 2024-08-06 PROCEDURE — 99244 OFF/OP CNSLTJ NEW/EST MOD 40: CPT | Performed by: INTERNAL MEDICINE

## 2024-08-06 PROCEDURE — 86364 TISS TRNSGLTMNASE EA IG CLAS: CPT

## 2024-08-06 PROCEDURE — 86258 DGP ANTIBODY EACH IG CLASS: CPT

## 2024-08-06 PROCEDURE — 36415 COLL VENOUS BLD VENIPUNCTURE: CPT

## 2024-08-06 NOTE — PROGRESS NOTES
[facility] - Outpatient Note  Sharon Patel 48 y.o. female MRN: [id number]  Unit/Bed#:  Encounter: [id number]          ASSESSMENT AND PLAN:      Ms. Sharon Patel is a 48 y.o. female with medical history significant for fistula s/p surgical repair in 2013, who presents for a consult visit due to bloating and screening colonoscopy.  Patient is scheduled to have colonoscopy completed by her colorectal surgeon next week.  Patient would also benefit from testing for celiac's disease and H. pylori due to ongoing chronic bloating    Dyspepsia  Screening for colorectal cancer  Recommended patient to monitor her current symptoms and to undergo colonoscopy along with other testing.  Patient to follow-up in 4 months to see if bloating has improved.  -Pylori breath test and celiac disease panel  -Return to clinic in 4 months to see if symptoms have improved    FOLLOW-UP:  No follow-ups on file.    VISIT DIAGNOSES AND ORDERS:      1. Dyspepsia    2. Encounter for colorectal cancer screening    3. Screening for colon cancer      Orders Placed This Encounter   Procedures    H. pylori breath test    Celiac Disease Panel         ______________________________________________________________________    HPI:      Ms. Sharon Patel is a 48 y.o. female with medical history significant for fistula s/p surgical repair in 2013, who presents for a consult visit due to bloating and screening colonoscopy.  Patient states she had a anal fissure line which was repaired by her colorectal surgeon back in 2013.  Since then she has been having soft, watery, loose bowel movements that sometimes lead to leakage.  She follows with her colorectal surgeon who prescribes in enzymes and increase fiber in her diet which has helped her symptoms.  Patient is 48 and is due for screening colonoscopy, Cologuard obtained in 2023 was negative.  She is scheduled to have a colonoscopy on Monday with her colorectal surgeon.    She also endorses  ongoing bloating for many years, she has tried a FODMAP diet however has not been successful in treating her bloating. She has never been tested for celiac's disease or H. pylori.        REVIEW OF SYSTEMS:    10 point ROS reviewed and negative except otherwise noted in the HPI above.     Historical Information   Past Medical History:   Diagnosis Date    Allergic     Anesthesia complication     believes awoke during procedure    Excessive and frequent menstruation     Exercise-induced asthma     Polyp of corpus uteri     Post concussion syndrome     doing vision therapy    Seasonal allergies      Past Surgical History:   Procedure Laterality Date    DILATION AND CURETTAGE OF UTERUS      POLYPECTOMY N/A 2019    Procedure: POLYPECTOMY;  Surgeon: Lilliam Coyle DO;  Location: [facility];  Service: Gynecology    UT HYSTEROSCOPY BX ENDOMETRIUM&/POLYPC W/WO D&C N/A 2019    Procedure: D&C, HYSTEROSCOPY W/ RESECTION;  Surgeon: Lilliam Coyle DO;  Location: [facility];  Service: Gynecology    TREATMENT FISTULA ANAL      WISDOM TOOTH EXTRACTION       Social History   Social History     Substance and Sexual Activity   Alcohol Use Yes    Comment: wine/ beer     Social History     Substance and Sexual Activity   Drug Use No     Social History     Tobacco Use   Smoking Status Former    Current packs/day: 0.00    Average packs/day: 1 pack/day for 7.0 years (7.0 ttl pk-yrs)    Types: Cigarettes    Start date:     Quit date:     Years since quittin.6   Smokeless Tobacco Former    Quit date:      Family History   Problem Relation Age of Onset    No Known Problems Mother     Lymphoma Father 65    Brain cancer Maternal Grandmother 50    No Known Problems Maternal Grandfather     Breast cancer Paternal Grandmother 92    Melanoma Paternal Grandfather 86    No Known Problems Brother     No Known Problems Son     No Known Problems Son     No Known Problems Maternal Aunt     No Known Problems Maternal Aunt   "   Brain cancer Paternal Aunt 70    Breast cancer Paternal Aunt 61       Meds/Allergies       Current Outpatient Medications:     albuterol (PROVENTIL HFA,VENTOLIN HFA) 90 mcg/act inhaler    cholecalciferol (VITAMIN D3) 1,000 units tablet    ibuprofen (MOTRIN) 200 mg tablet    Omega 3-6-9 Fatty Acids (OMEGA 3-6-9 COMPLEX PO)    vitamin B-12 (VITAMIN B-12) 1,000 mcg tablet    benzonatate (TESSALON) 200 MG capsule    MAG MAL-VIT B6-POT CIT-TAURINE PO    methylPREDNISolone 4 MG tablet therapy pack    pseudoephedrine-guaifenesin (MUCINEX D)  MG per tablet    Zinc 22.5 MG TABS    Allergies   Allergen Reactions    Other Wheezing     seasonal       Objective     Blood pressure 122/68, pulse 86, temperature 97.9 °F (36.6 °C), temperature source Temporal, SpO2 99%, not currently breastfeeding.    PHYSICAL EXAM:    General: Well-appearing, NAD  Eyes:  no conjunctival icterus or pallor  Abdominal: Soft, non-tender, non-distended  Neuro: alert and oriented  Psych: Normal affect    Lab Results:   Lab Results   Component Value Date    K 4.3 03/10/2023    CO2 26 03/10/2023     03/10/2023    BUN 6 (L) 03/10/2023    CREATININE 0.68 03/10/2023     Lab Results   Component Value Date    WBC 5.64 11/07/2019    HGB 11.9 11/07/2019    HCT 35.9 11/07/2019    MCV 97 11/07/2019     11/07/2019     Lab Results   Component Value Date    TP 6.1 (L) 03/10/2023    AST 12 03/10/2023    ALT 10 03/10/2023      No results found for: \"AFP\", \"IRON\", \"LABIRON\", \"FERRITIN\"  No results found for: \"CHOL\", \"HDL\", \"TRIG\", \"LDLCAL\", \"LDL\"    Radiology Results:   I have reviewed the results of any radiology studies performed within the last 90 days.     Rosa Elena Ware MD  Gastroenterology Fellow  [facility]  Division of Gastroenterology and Hepatology    "

## 2024-08-07 ENCOUNTER — APPOINTMENT (OUTPATIENT)
Dept: LAB | Facility: HOSPITAL | Age: 49
End: 2024-08-07
Payer: COMMERCIAL

## 2024-08-07 DIAGNOSIS — Z13.228 SCREENING FOR METABOLIC DISORDER: ICD-10-CM

## 2024-08-07 LAB
ALBUMIN SERPL BCG-MCNC: 4.1 G/DL (ref 3.5–5)
ALP SERPL-CCNC: 54 U/L (ref 34–104)
ALT SERPL W P-5'-P-CCNC: 8 U/L (ref 7–52)
ANION GAP SERPL CALCULATED.3IONS-SCNC: 3 MMOL/L (ref 4–13)
AST SERPL W P-5'-P-CCNC: 11 U/L (ref 13–39)
BASOPHILS # BLD AUTO: 0.03 THOUSANDS/ÂΜL (ref 0–0.1)
BASOPHILS NFR BLD AUTO: 1 % (ref 0–1)
BILIRUB SERPL-MCNC: 0.71 MG/DL (ref 0.2–1)
BUN SERPL-MCNC: 10 MG/DL (ref 5–25)
CALCIUM SERPL-MCNC: 8.6 MG/DL (ref 8.4–10.2)
CHLORIDE SERPL-SCNC: 106 MMOL/L (ref 96–108)
CHOLEST SERPL-MCNC: 167 MG/DL
CO2 SERPL-SCNC: 28 MMOL/L (ref 21–32)
CREAT SERPL-MCNC: 0.63 MG/DL (ref 0.6–1.3)
EOSINOPHIL # BLD AUTO: 0.07 THOUSAND/ÂΜL (ref 0–0.61)
EOSINOPHIL NFR BLD AUTO: 1 % (ref 0–6)
ERYTHROCYTE [DISTWIDTH] IN BLOOD BY AUTOMATED COUNT: 12.7 % (ref 11.6–15.1)
GFR SERPL CREATININE-BSD FRML MDRD: 107 ML/MIN/1.73SQ M
GLUCOSE P FAST SERPL-MCNC: 91 MG/DL (ref 65–99)
HCT VFR BLD AUTO: 36.7 % (ref 34.8–46.1)
HDLC SERPL-MCNC: 56 MG/DL
HGB BLD-MCNC: 12.4 G/DL (ref 11.5–15.4)
IMM GRANULOCYTES # BLD AUTO: 0.01 THOUSAND/UL (ref 0–0.2)
IMM GRANULOCYTES NFR BLD AUTO: 0 % (ref 0–2)
LDLC SERPL CALC-MCNC: 93 MG/DL (ref 0–100)
LYMPHOCYTES # BLD AUTO: 1.55 THOUSANDS/ÂΜL (ref 0.6–4.47)
LYMPHOCYTES NFR BLD AUTO: 25 % (ref 14–44)
MCH RBC QN AUTO: 32.5 PG (ref 26.8–34.3)
MCHC RBC AUTO-ENTMCNC: 33.8 G/DL (ref 31.4–37.4)
MCV RBC AUTO: 96 FL (ref 82–98)
MONOCYTES # BLD AUTO: 0.44 THOUSAND/ÂΜL (ref 0.17–1.22)
MONOCYTES NFR BLD AUTO: 7 % (ref 4–12)
NEUTROPHILS # BLD AUTO: 4.03 THOUSANDS/ÂΜL (ref 1.85–7.62)
NEUTS SEG NFR BLD AUTO: 66 % (ref 43–75)
NONHDLC SERPL-MCNC: 111 MG/DL
NRBC BLD AUTO-RTO: 0 /100 WBCS
PLATELET # BLD AUTO: 325 THOUSANDS/UL (ref 149–390)
PMV BLD AUTO: 10.2 FL (ref 8.9–12.7)
POTASSIUM SERPL-SCNC: 4.1 MMOL/L (ref 3.5–5.3)
PROT SERPL-MCNC: 6.1 G/DL (ref 6.4–8.4)
RBC # BLD AUTO: 3.81 MILLION/UL (ref 3.81–5.12)
SODIUM SERPL-SCNC: 137 MMOL/L (ref 135–147)
TRIGL SERPL-MCNC: 89 MG/DL
WBC # BLD AUTO: 6.13 THOUSAND/UL (ref 4.31–10.16)

## 2024-08-07 PROCEDURE — 36415 COLL VENOUS BLD VENIPUNCTURE: CPT

## 2024-08-07 PROCEDURE — 80053 COMPREHEN METABOLIC PANEL: CPT

## 2024-08-07 PROCEDURE — 83014 H PYLORI DRUG ADMIN: CPT

## 2024-08-07 PROCEDURE — 83013 H PYLORI (C-13) BREATH: CPT

## 2024-08-08 ENCOUNTER — TELEPHONE (OUTPATIENT)
Dept: GASTROENTEROLOGY | Facility: CLINIC | Age: 49
End: 2024-08-08

## 2024-08-08 ENCOUNTER — PREP FOR PROCEDURE (OUTPATIENT)
Dept: GASTROENTEROLOGY | Facility: CLINIC | Age: 49
End: 2024-08-08

## 2024-08-08 DIAGNOSIS — K90.0 CELIAC DISEASE: Primary | ICD-10-CM

## 2024-08-08 LAB — UREA BREATH TEST QL: NEGATIVE

## 2024-08-08 NOTE — TELEPHONE ENCOUNTER
August 8, 2024  Sharon Patel   to [facility] (supporting Kristin Delgado DO)         8/8/24 11:35 AM  Hello, one of the results came back abnormal. I wondered if someone can call me about that. Also for a panel for my PCP my proteins came back low. I wondered if those could be related.  This encounter is not signed. The conversation may still be sidney

## 2024-08-08 NOTE — TELEPHONE ENCOUNTER
Called patient and discussed test results with her.  She is open to undergoing an endoscopy to have biopsies to check for celiac's disease.  Reached out to schedulers to schedule the patient.

## 2024-08-08 NOTE — TELEPHONE ENCOUNTER
Patients GI provider:  Dr. Delgado    Number to return call: [phone]    Reason for call: Pt calling to discuss lab results. States she received results on [facility] and celiac testing was abnormal. Please see [employer] message in separate encounter.     Scheduled procedure/appointment date if applicable: Apt 9/13

## 2024-08-08 NOTE — TELEPHONE ENCOUNTER
Pt is calling to ask for the celiac testing she had be faxed to Dr Calderon at [phone]. Pt has a colonoscopy on 8/12/24 and they were able to add on the EGD with Dr Villavicencio and they need a copy of the lab. Please fax over

## 2024-08-08 NOTE — TELEPHONE ENCOUNTER
Called patient and discussed results of her celiac panel. She is interested in pursuing an EGD. I messaged schedulers to schedule the patient for an EGD with celiac biopsies.  All questions were answered to the best of my ability.

## 2024-08-12 PROCEDURE — 88305 TISSUE EXAM BY PATHOLOGIST: CPT | Performed by: STUDENT IN AN ORGANIZED HEALTH CARE EDUCATION/TRAINING PROGRAM

## 2024-08-13 ENCOUNTER — LAB REQUISITION (OUTPATIENT)
Dept: LAB | Facility: HOSPITAL | Age: 49
End: 2024-08-13
Payer: COMMERCIAL

## 2024-08-13 DIAGNOSIS — R68.89 OTHER GENERAL SYMPTOMS AND SIGNS: ICD-10-CM

## 2024-08-14 PROCEDURE — 88305 TISSUE EXAM BY PATHOLOGIST: CPT | Performed by: STUDENT IN AN ORGANIZED HEALTH CARE EDUCATION/TRAINING PROGRAM

## 2024-08-26 ENCOUNTER — VBI (OUTPATIENT)
Dept: ADMINISTRATIVE | Facility: OTHER | Age: 49
End: 2024-08-26

## 2024-08-26 NOTE — TELEPHONE ENCOUNTER
08/26/24 10:14 AM     Chart reviewed for Pap Smear (HPV) aka Cervical Cancer Screening ; nothing is submitted to the patient's insurance at this time.     VALE HUNG MA   [facility]

## 2024-09-04 ENCOUNTER — RA CDI HCC (OUTPATIENT)
Dept: OTHER | Facility: HOSPITAL | Age: 49
End: 2024-09-04

## 2024-09-04 NOTE — PROGRESS NOTES
Exercise-induced asthma  Noted 9/11/2010  [J45.990]    NOT on the BPA- please assess using MEAT for 2024 billing    HCC coding opportunities          Chart Reviewed number of suggestions sent to Provider: 1     Patients Insurance        Commercial Insurance: [employer]

## 2024-09-13 ENCOUNTER — OFFICE VISIT (OUTPATIENT)
Age: 49
End: 2024-09-13
Payer: COMMERCIAL

## 2024-09-13 VITALS
OXYGEN SATURATION: 99 % | HEART RATE: 75 BPM | SYSTOLIC BLOOD PRESSURE: 110 MMHG | DIASTOLIC BLOOD PRESSURE: 78 MMHG | BODY MASS INDEX: 26.52 KG/M2 | HEIGHT: 65 IN | WEIGHT: 159.2 LBS | TEMPERATURE: 98 F

## 2024-09-13 DIAGNOSIS — Z23 NEED FOR INFLUENZA VACCINATION: ICD-10-CM

## 2024-09-13 DIAGNOSIS — E78.5 HYPERLIPIDEMIA, UNSPECIFIED HYPERLIPIDEMIA TYPE: ICD-10-CM

## 2024-09-13 DIAGNOSIS — Z00.00 ANNUAL PHYSICAL EXAM: Primary | ICD-10-CM

## 2024-09-13 DIAGNOSIS — Z13.228 SCREENING FOR METABOLIC DISORDER: ICD-10-CM

## 2024-09-13 DIAGNOSIS — H53.9 CHANGE IN VISION: ICD-10-CM

## 2024-09-13 PROCEDURE — 99396 PREV VISIT EST AGE 40-64: CPT | Performed by: NURSE PRACTITIONER

## 2024-09-13 PROCEDURE — 90471 IMMUNIZATION ADMIN: CPT

## 2024-09-13 PROCEDURE — 90656 IIV3 VACC NO PRSV 0.5 ML IM: CPT

## 2024-09-13 NOTE — ASSESSMENT & PLAN NOTE
- Up-to-date with colon, cervical and breast cancer screening  -reviewed shemar blood work, repeat blood work in 1 year with annual physical  -due for Tdap vaccination, flu shot given while in the office today  -Continue with well-balanced diet, continue daily exercise

## 2024-09-13 NOTE — PATIENT INSTRUCTIONS
"Patient Education     Routine physical for adults   The Basics   Written by the doctors and editors at [facility]   What is a physical? -- A physical is a routine visit, or \"check-up,\" with your doctor. You might also hear it called a \"wellness visit\" or \"preventive visit.\"  During each visit, the doctor will:   Ask about your physical and mental health   Ask about your habits, behaviors, and lifestyle   Do an exam   Give you vaccines if needed   Talk to you about any medicines you take   Give advice about your health   Answer your questions  Getting regular check-ups is an important part of taking care of your health. It can help your doctor find and treat any problems you have. But it's also important for preventing health problems.  A routine physical is different from a \"sick visit.\" A sick visit is when you see a doctor because of a health concern or problem. Since physicals are scheduled ahead of time, you can think about what you want to ask the doctor.  How often should I get a physical? -- It depends on your age and health. In general, for people age 21 years and older:   If you are younger than 50 years, you might be able to get a physical every 3 years.   If you are 50 years or older, your doctor might recommend a physical every year.  If you have an ongoing health condition, like diabetes or high blood pressure, your doctor will probably want to see you more often.  What happens during a physical? -- In general, each visit will include:   Physical exam - The doctor or nurse will check your height, weight, heart rate, and blood pressure. They will also look at your eyes and ears. They will ask about how you are feeling and whether you have any symptoms that bother you.   Medicines - It's a good idea to bring a list of all the medicines you take to each doctor visit. Your doctor will talk to you about your medicines and answer any questions. Tell them if you are having any side effects that bother you. You " "should also tell them if you are having trouble paying for any of your medicines.   Habits and behaviors - This includes:   Your diet   Your exercise habits   Whether you smoke, drink alcohol, or use drugs   Whether you are sexually active   Whether you feel safe at home  Your doctor will talk to you about things you can do to improve your health and lower your risk of health problems. They will also offer help and support. For example, if you want to quit smoking, they can give you advice and might prescribe medicines. If you want to improve your diet or get more physical activity, they can help you with this, too.   Lab tests, if needed - The tests you get will depend on your age and situation. For example, your doctor might want to check your:   Cholesterol   Blood sugar   Iron level   Vaccines - The recommended vaccines will depend on your age, health, and what vaccines you already had. Vaccines are very important because they can prevent certain serious or deadly infections.   Discussion of screening - \"Screening\" means checking for diseases or other health problems before they cause symptoms. Your doctor can recommend screening based on your age, risk, and preferences. This might include tests to check for:   Cancer, such as breast, prostate, cervical, ovarian, colorectal, prostate, lung, or skin cancer   Sexually transmitted infections, such as chlamydia and gonorrhea   Mental health conditions like depression and anxiety  Your doctor will talk to you about the different types of screening tests. They can help you decide which screenings to have. They can also explain what the results might mean.   Answering questions - The physical is a good time to ask the doctor or nurse questions about your health. If needed, they can refer you to other doctors or specialists, too.  Adults older than 65 years often need other care, too. As you get older, your doctor will talk to you about:   How to prevent falling at " home   Hearing or vision tests   Memory testing   How to take your medicines safely   Making sure that you have the help and support you need at home  All topics are updated as new evidence becomes available and our peer review process is complete.  This topic retrieved from [employer] on: May 02, 2024.  Topic 287193 Version 1.0  Release: 32.4.3 - C32.122  © 2024 UpToDate, Inc. and/or its affiliates. All rights reserved.  Consumer Information Use and Disclaimer   Disclaimer: This generalized information is a limited summary of diagnosis, treatment, and/or medication information. It is not meant to be comprehensive and should be used as a tool to help the user understand and/or assess potential diagnostic and treatment options. It does NOT include all information about conditions, treatments, medications, side effects, or risks that may apply to a specific patient. It is not intended to be medical advice or a substitute for the medical advice, diagnosis, or treatment of a health care provider based on the health care provider's examination and assessment of a patient's specific and unique circumstances. Patients must speak with a health care provider for complete information about their health, medical questions, and treatment options, including any risks or benefits regarding use of medications. This information does not endorse any treatments or medications as safe, effective, or approved for treating a specific patient. UpToDate, Inc. and its affiliates disclaim any warranty or liability relating to this information or the use thereof.The use of this information is governed by the Terms of Use, available at https://www.woltersWhiphanduwer.com/en/know/clinical-effectiveness-terms. 2024© UpToDate, Inc. and its affiliates and/or licensors. All rights reserved.  Copyright   © 2024 UpToDate, Inc. and/or its affiliates. All rights reserved.

## 2024-09-13 NOTE — PROGRESS NOTES
Adult Annual Physical  Name: Sharon Patel      : 1975      MRN: [id number]  Encounter Provider: JOSE Bass  Encounter Date: 2024   Encounter department: [facility] CARE [place]    Assessment & Plan  Annual physical exam  - Up-to-date with colon, cervical and breast cancer screening  -reviewed fating blood work, repeat blood work in 1 year with annual physical  -due for Tdap vaccination, flu shot given while in the office today  -Continue with well-balanced diet, continue daily exercise          Change in vision    Orders:    Ambulatory Referral to Ophthalmology; Future    Screening for metabolic disorder    Orders:    Comprehensive metabolic panel; Future    CBC and differential    Lipid panel    Hyperlipidemia, unspecified hyperlipidemia type    Orders:    Lipid panel    Immunizations and preventive care screenings were discussed with patient today. Appropriate education was printed on patient's after visit summary.             History of Present Illness     Adult Annual Physical:  Patient presents for annual physical.     Diet and Physical Activity:  - Diet/Nutrition: well balanced diet. started eat gluten free  - Exercise: 3-4 times a week on average.    Depression Screening:  - PHQ-2 Score: 0    General Health:  - Sleep: sleeps well.  - Hearing: normal hearing left ear and normal hearing right ear.  - Vision: vision problems. vision has changed  - Dental: regular dental visits.    /GYN Health:  - Follows with GYN: yes.   - Last menstrual cycle: 2024.   - History of STDs: no    Review of Systems   Constitutional:  Negative for activity change, appetite change, chills, diaphoresis and fever.   HENT:  Negative for congestion, ear discharge, ear pain, postnasal drip, rhinorrhea, sinus pressure, sinus pain and sore throat.    Eyes:  Negative for pain, discharge, itching and visual disturbance.   Respiratory:  Negative for cough, chest tightness, shortness  "of breath and wheezing.    Cardiovascular:  Negative for chest pain, palpitations and leg swelling.   Gastrointestinal:  Negative for abdominal pain, constipation, diarrhea, nausea and vomiting.   Endocrine: Negative for polydipsia, polyphagia and polyuria.   Genitourinary:  Negative for difficulty urinating, dysuria and urgency.   Musculoskeletal:  Negative for arthralgias, back pain and neck pain.   Skin:  Negative for rash and wound.   Neurological:  Negative for dizziness, weakness, numbness and headaches.         Objective     /78 (BP Location: Left arm, Patient Position: Sitting, Cuff Size: Standard)   Pulse 75   Temp 98 °F (36.7 °C) (Temporal)   Ht 5' 5.28\" (1.658 m)   Wt 72.2 kg (159 lb 3.2 oz)   SpO2 99%   BMI 26.27 kg/m²     Physical Exam  Constitutional:       General: She is not in acute distress.     Appearance: She is well-developed. She is not diaphoretic.   HENT:      Head: Normocephalic and atraumatic.      Right Ear: External ear normal.      Left Ear: External ear normal.      Nose: Nose normal.      Mouth/Throat:      Mouth: Mucous membranes are moist.      Pharynx: No oropharyngeal exudate or posterior oropharyngeal erythema.   Eyes:      General:         Right eye: No discharge.         Left eye: No discharge.      Conjunctiva/sclera: Conjunctivae normal.      Pupils: Pupils are equal, round, and reactive to light.   Neck:      Thyroid: No thyromegaly.   Cardiovascular:      Rate and Rhythm: Normal rate and regular rhythm.      Heart sounds: Normal heart sounds. No murmur heard.     No friction rub. No gallop.   Pulmonary:      Effort: Pulmonary effort is normal. No respiratory distress.      Breath sounds: Normal breath sounds. No stridor. No wheezing or rales.   Abdominal:      General: Bowel sounds are normal. There is no distension.      Palpations: Abdomen is soft.      Tenderness: There is no abdominal tenderness.   Musculoskeletal:      Cervical back: Normal range of motion " and neck supple.   Lymphadenopathy:      Cervical: No cervical adenopathy.   Skin:     General: Skin is warm and dry.      Findings: No erythema or rash.   Neurological:      Mental Status: She is alert and oriented to person, place, and time.   Psychiatric:         Behavior: Behavior normal.         Thought Content: Thought content normal.         Judgment: Judgment normal.

## 2024-09-16 ENCOUNTER — TELEPHONE (OUTPATIENT)
Dept: ADMINISTRATIVE | Facility: OTHER | Age: 49
End: 2024-09-16

## 2024-09-16 NOTE — TELEPHONE ENCOUNTER
Upon review of the In Basket request and the patient's chart, initial outreach has been made via fax to facility. Please see Contacts section for details.     Thank you  Demi Oliveira

## 2024-09-16 NOTE — TELEPHONE ENCOUNTER
----- Message from Rain WARD sent at 9/13/2024  2:49 PM EDT -----  Regarding: pap  ..09/13/24 2:49 PM    Amina, our patient Sharon Patel has had Pap Smear (HPV) aka Cervical Cancer Screening completed/performed. Please assist in updating the patient chart by making an External outreach to [facility] facility located in [place]. The date of service is  2024.    Thank you,  Rain Lee MA  [facility] PRIMARY CARE [place]

## 2024-09-16 NOTE — LETTER
Procedure Request Form: Cervical Cancer Screening      Date Requested: 24  Patient: Sharon Patel  Patient : 1975   Referring Provider: JOSE Bass        Date of Procedure ______________________________       The above patient has informed us that they have completed their   most recent Cervical Cancer Screening at your facility. Please complete   this form and attach all corresponding procedure reports/results.    Comments __________________________________________________________  ____________________________________________________________________  ____________________________________________________________________  ____________________________________________________________________    Facility Completing Procedure _________________________________________    Form Completed By (print name) _______________________________________      Signature __________________________________________________________      These reports are needed for  compliance.    Please fax this completed form and a copy of the procedure report to our office located at [address] [place] [address] as soon as possible to Fax [phone] gisel Simms: Phone [phone]    We thank you for your assistance in treating our mutual patient.

## 2024-09-17 ENCOUNTER — TELEPHONE (OUTPATIENT)
Age: 49
End: 2024-09-17

## 2024-09-20 NOTE — TELEPHONE ENCOUNTER
As a follow-up, a second attempt has been made for outreach via fax to facility. Please see Contacts section for details.    Thank you  Demi Oliveira

## 2024-09-23 NOTE — TELEPHONE ENCOUNTER
Upon review of the In Basket request we were able to locate, review, and update the patient chart as requested for Pap Smear (HPV) aka Cervical Cancer Screening.    Any additional questions or concerns should be emailed to the Practice Liaisons via the appropriate education email address, please do not reply via In Basket.    Thank you  Demi Oliveira [facility]

## 2024-10-24 ENCOUNTER — OFFICE VISIT (OUTPATIENT)
Dept: OBGYN CLINIC | Facility: OTHER | Age: 49
End: 2024-10-24
Payer: COMMERCIAL

## 2024-10-24 ENCOUNTER — APPOINTMENT (OUTPATIENT)
Dept: RADIOLOGY | Facility: OTHER | Age: 49
End: 2024-10-24
Payer: COMMERCIAL

## 2024-10-24 VITALS
BODY MASS INDEX: 26.69 KG/M2 | HEIGHT: 65 IN | DIASTOLIC BLOOD PRESSURE: 83 MMHG | HEART RATE: 75 BPM | WEIGHT: 160.2 LBS | SYSTOLIC BLOOD PRESSURE: 117 MMHG

## 2024-10-24 DIAGNOSIS — M25.511 RIGHT SHOULDER PAIN, UNSPECIFIED CHRONICITY: ICD-10-CM

## 2024-10-24 DIAGNOSIS — M25.521 PAIN IN RIGHT ELBOW: ICD-10-CM

## 2024-10-24 DIAGNOSIS — M77.11 LATERAL EPICONDYLITIS OF RIGHT ELBOW: ICD-10-CM

## 2024-10-24 DIAGNOSIS — M75.81 ROTATOR CUFF TENDONITIS, RIGHT: Primary | ICD-10-CM

## 2024-10-24 DIAGNOSIS — M75.51 SUBACROMIAL BURSITIS OF RIGHT SHOULDER JOINT: ICD-10-CM

## 2024-10-24 PROCEDURE — 99204 OFFICE O/P NEW MOD 45 MIN: CPT | Performed by: ORTHOPAEDIC SURGERY

## 2024-10-24 PROCEDURE — 73030 X-RAY EXAM OF SHOULDER: CPT

## 2024-10-24 PROCEDURE — 73080 X-RAY EXAM OF ELBOW: CPT

## 2024-10-24 RX ORDER — CETIRIZINE HYDROCHLORIDE 10 MG/1
TABLET ORAL
COMMUNITY

## 2024-10-24 RX ORDER — MONTELUKAST SODIUM 10 MG/1
TABLET ORAL
COMMUNITY

## 2024-10-24 NOTE — PROGRESS NOTES
"  Assessment  Diagnoses and all orders for this visit:    Rotator cuff tendonitis, right    Subacromial bursitis of right shoulder joint    Lateral epicondylitis of right elbow      Discussion and Plan:    The patient has an examination consistent with subacromial impingement syndrome of the right shoulder and right lateral epicondylitis.  I have discussed with the patient the pathophysiology of this diagnosis and reviewed how the examination correlates with this diagnosis.  Treatment options were discussed at length and after discussing these treatment options, the patient was provided with a prescription for referral to physical therapy while we wait for the MRI.  MRI of the right shoulder ordered to evaluate for internal derangement.     Subjective:   Patient ID: Sharon Patel is a 48 y.o. female      HPI    The patient presents with a chief complaint of right shoulder and right elbow pain.   The pain began 1 year(s) ago and wors[place] The patient describes the pain as aching, dull, and sharp in intensity,  intermittent in timing, and localizes the pain to the  right subacromial joint.  The pain is worse with movement and overuse and relieved by rest.  The pain is not associated with numbness and tingling.  The pain is not associated with constitutional symptoms. The patient is not awoken at night by the pain.      Patient localizes her elbow pain to the lateral aspect which worsen with activities.       The following portions of the patient's history were reviewed and updated as appropriate: allergies, current medications, past family history, past medical history, past social history, past surgical history and problem list.        Objective:  /83   Pulse 75   Ht 5' 5.28\" (1.658 m)   Wt 72.7 kg (160 lb 3.2 oz)   BMI 26.43 kg/m²       Right Elbow Exam     Tenderness   The patient is experiencing tenderness in the lateral epicondyle.     Range of Motion   The " patient has normal right elbow ROM.    Muscle Strength   The patient has normal right elbow strength.    Tests   Varus: negative  Valgus: negative    Other   Erythema: absent  Sensation: normal  Pulse: present    Comments:    Pain with resisted wrist extension       Right Shoulder Exam     Tenderness   The patient is experiencing no tenderness.    Range of Motion   The patient has normal right shoulder ROM.    Muscle Strength   Abduction: 5/5   Internal rotation: 5/5   External rotation: 5/5     Tests   Patel test: positive  Impingement: positive            Physical Exam  Vitals reviewed.   Constitutional:       Appearance: She is well-developed.   Eyes:      Pupils: Pupils are equal, round, and reactive to light.   Pulmonary:      Effort: Pulmonary effort is normal.      Breath sounds: Normal breath sounds.   Abdominal:      General: Abdomen is flat. There is no distension.   Skin:     General: Skin is warm and dry.   Neurological:      Mental Status: She is alert and oriented to person, place, and time.   Psychiatric:         Behavior: Behavior normal.         Thought Content: Thought content normal.         Judgment: Judgment normal.           I have personally reviewed pertinent films in PACS and my interpretation is as follows.    Right shoulder x-rays demonstrates no fracture or dislocation  Right elbow x-rays demonstrates no fracture or dislocation    Scribe Attestation      I,:  Kristin Uriostegui MA am acting as a scribe while in the presence of the attending physician.:       I,:  Norberto Jalloh MD personally performed the services described in this documentation    as scribed in my presence.:

## 2024-10-28 ENCOUNTER — EVALUATION (OUTPATIENT)
Dept: PHYSICAL THERAPY | Facility: OTHER | Age: 49
End: 2024-10-28
Payer: COMMERCIAL

## 2024-10-28 DIAGNOSIS — M75.51 SUBACROMIAL BURSITIS OF RIGHT SHOULDER JOINT: ICD-10-CM

## 2024-10-28 DIAGNOSIS — M75.81 ROTATOR CUFF TENDONITIS, RIGHT: ICD-10-CM

## 2024-10-28 DIAGNOSIS — M77.11 LATERAL EPICONDYLITIS OF RIGHT ELBOW: ICD-10-CM

## 2024-10-28 PROCEDURE — 97112 NEUROMUSCULAR REEDUCATION: CPT

## 2024-10-28 PROCEDURE — 97162 PT EVAL MOD COMPLEX 30 MIN: CPT

## 2024-10-28 NOTE — PROGRESS NOTES
PT Evaluation     Today's date: 10/28/2024  Patient name: Sharon Patel  : 1975  MRN: [id number]  Referring provider: Norberto Jalloh*  Dx:   Encounter Diagnosis     ICD-10-CM    1. Rotator cuff tendonitis, right  M75.81 Ambulatory Referral to Physical Therapy      2. Subacromial bursitis of right shoulder joint  M75.51 Ambulatory Referral to Physical Therapy      3. Lateral epicondylitis of right elbow  M77.11 Ambulatory Referral to Physical Therapy          Start Time: 745  Stop Time: 830  Total time in clinic (min): 45 minutes    Assessment  Impairments: abnormal coordination, abnormal muscle firing, abnormal muscle tone, abnormal or restricted ROM, abnormal movement, activity intolerance, impaired physical strength, lacks appropriate home exercise program, pain with function, poor posture , unable to perform ADL, participation limitations, activity limitations and endurance  Symptom irritability: moderate    Assessment details: Problem List/Impairments:  1) R shoulder hypomobility deficits  2) R shoulder/elbow neuromotor/force deficits secondary to pain  2) Thoracic spine mobility deficits    Sharon Patel is a pleasant 49 y.o. female who presents with right shoulder/elbow pain. She presents upon exam with problem list/impairments noted above, nociceptive pain with potential mixed neuropathic pain type, and medical diagnosis of rotator cuff tendinitis and lateral epicondylitis resulting in the pain she is experiencing, worry over not knowing what's wrong, concern at no signs of improvement, fear of not being able to keep active, and future ill health (and wanting to prevent it). No further referral appears necessary at this time based upon examination results. I expect she will demonstrate improvements in function/symptoms over 8-10 weeks. Positive prognostic indicators include positive attitude toward recovery, good understanding of diagnosis and treatment plan options, and absence of observed  red flags. Negative prognostic indicators include chronicity of symptoms, high symptom irritability, and multiple concurrent orthopedic problems. Patient was provided with a customized home exercise program to begin performing on their own. All patient questions and concerns have been addressed at this time. Thank you for the kind referral.  Understanding of Dx/Px/POC: good     Prognosis: good    Goals  Short Term Goals:   1. Patient will be independent with a customized HEP.  2. Patient will report at least 40% improvement overall with performance of ADL's/recreational activities.  3. Patient will demonstrate right shoulder ROM within 5 degrees or better of left shoulder ROM all planes.  4. Patient will demonstrate half grade improvement MMT or better right shoulder all planes.  5. Patient will demonstrate functional thoracic mobility.  6. Patient will demonstrate pain free, 5/5 MMT of right wrist extension.    Long Term Goals:   1. Patient will improve FOTO to greater than goal of 68 shoulder.  2. Patient will improve FOTO to greater than goal of 64 elbow.  3. Patient will continue with HEP independence to allow for decreased future reoccurrence of pain and loss in function.  4. Patient will report at least 80% improvement overall with performance of ADL's/recreational activities.    Plan  Patient would benefit from: PT eval and skilled physical therapy  Planned modality interventions: cryotherapy, TENS, thermotherapy: hydrocollator packs, low level laser therapy and electrical stimulation/Russian stimulation  Other planned modality interventions: EPAT. BFR.    Planned therapy interventions: manual therapy, massage, joint mobilization, coordination, graded exercise, graded activity, functional ROM exercises, therapeutic exercise, therapeutic activities, patient education, neuromuscular re-education, home exercise program, flexibility, strengthening, IASTM, kinesiology taping, [place] taping, nerve gliding,  "balance/weight bearing training, body mechanics training and stretching    Frequency: 1-2x week  Duration in weeks: 12  Plan of Care beginning date: 10/28/2024  Plan of Care expiration date: 2025  Treatment plan discussed with: patient  Plan details: Prognosis above is given PT services 2x/week tapering to 1x/week over the next 2-3 months and home program adherence.        Subjective Evaluation    History of Present Illness  Mechanism of injury: Patient is a 49 y.o. female presenting to physical therapy with chief complaint of right shoulder and right elbow pain. Patient reports her right elbow symptoms have been present for over a year and her right shoulder symptoms have been present for about a month with two mechanisms of injury (1st being lifting a heavy ladder in water and 2nd being a quick stretch/reach out in abduction). Patient denies numbness/tingling, but does report a burning sensation posterior right shoulder.  Quality of life: fair    Patient Goals  Patient goals for therapy: independence with ADLs/IADLs, decreased pain, increased strength, return to sport/leisure activities and increased motion  Patient goal: \"I want to be able to use my arm throughout the day without pain.\"  Pain  Current pain ratin  At best pain ratin  Pain scale at highest: R shoulder 10/10, R Elbow 6/10.  Quality: dull ache, sharp, tight and burning  Relieving factors: rest  Aggravating factors: overhead activity and lifting    Social Support    Employment status: working ()  Hand dominance: right  Exercise history: biking/lifting/gym/jogging          Objective    Posture: Mild forward head/rounded shoulders  Palpation: TTP R common extensor tendon, R posterior RTC/proximal bicep/subscapularis  Dermatome: (pinprick- L/R): Intact light touch B/L UE           Cervical  % of normal   Flex. 100   Extn. 100   SB Left 100   SB Right 100   ROT Left 100   ROT Right 100         MMT         AROM          PROM  " "  Shoulder       L       R        L           R      L     R   Flex. 5 4 P!  P  P!   Abd. 5 4 P!  P  P!   IR. 5 4+ T5 T10 P 70 45 P!   ER. 5 3+ P! T5 T3 P 100 90 P!            Elbow         Flex. 5 5       Ext. 5 5       Sup.         Pron.                  Wrist         Flex. 5 5       Ext. 5 4+ P!       RD         UD                                                        Spurling (-)     Rotator Cuff Testing:  ER Lag: negative   Drop Arm: negative     Impingement Testing:   Patel-Pedrito: positive  Neers: positive    Neuro Dynamic Testing:  Median Nerve: L= -   R= -  Ulnar Nerve: L= -    R= -      Radial Nerve: L= -   R= inconclusive      ANTT findings:      Segmental mobility: TS: Slight hypomobility       Precautions: Asthma.    POC expires Unit limit Auth Expiration date PT/OT + Visit Limit?   1/20/25 BOMN N/A 30 PCY                             Visit 1 IE            Manuals 10/28            Shoulder ROM             Soft Tissue Deformation GJL R subscap/posterior RTC            Lateral Epicondylitis MWM                                                                 Neuro Re-Ed             Supine chin tuck with serratus reach 10x5\" HEP            Row             LPD             Prone T             Wall Slide LT Lift             Eccentric wrist extension 2# 2x10 HEP            Isometrics ER/  Abd NV?                                                   Ther Ex             Thoracic extension chair             Thoracic ext supine foam roll             Self STM Posterior RTC/subscap 2' ball on wall HEP                                                                             Ther Activity                                       Gait Training                                       Modalities                                            "

## 2024-10-31 ENCOUNTER — OFFICE VISIT (OUTPATIENT)
Dept: PHYSICAL THERAPY | Facility: OTHER | Age: 49
End: 2024-10-31
Payer: COMMERCIAL

## 2024-10-31 DIAGNOSIS — M75.81 ROTATOR CUFF TENDONITIS, RIGHT: Primary | ICD-10-CM

## 2024-10-31 DIAGNOSIS — M77.11 LATERAL EPICONDYLITIS OF RIGHT ELBOW: ICD-10-CM

## 2024-10-31 DIAGNOSIS — M75.51 SUBACROMIAL BURSITIS OF RIGHT SHOULDER JOINT: ICD-10-CM

## 2024-10-31 PROCEDURE — 97112 NEUROMUSCULAR REEDUCATION: CPT

## 2024-10-31 PROCEDURE — 97110 THERAPEUTIC EXERCISES: CPT

## 2024-10-31 PROCEDURE — 97140 MANUAL THERAPY 1/> REGIONS: CPT

## 2024-10-31 NOTE — PROGRESS NOTES
"Daily Note     Today's date: 10/31/2024  Patient name: Sharon Patel  : 1975  MRN: [id number]  Referring provider: Norberto Jalloh*  Dx:   Encounter Diagnosis     ICD-10-CM    1. Rotator cuff tendonitis, right  M75.81       2. Subacromial bursitis of right shoulder joint  M75.51       3. Lateral epicondylitis of right elbow  M77.11           Start Time: 1628  Stop Time: 1706  Total time in clinic (min): 38 minutes  1 on 1 24 minutes all else IEP     Subjective: Patient reports she was sore after last visit, but is doing okay today.       Objective: See treatment diary below    R Median/Ulnar Nerve ANTT   - improved following manual c/s retractions and c/s retraction + extension      Assessment: Tolerated treatment well with focus on symptom management and scapular/RTC strengthening/neuromuscular re-education. Verbal/tactile cues required for form throughout. Patient demonstrated fatigue post treatment, exhibited good technique with therapeutic exercises, and would benefit from continued PT.      Plan: Continue per plan of care.      Precautions: Asthma.    POC expires Unit limit Auth Expiration date PT/OT + Visit Limit?   25 BOMN N/A 30 PCY                             Visit 1 IE 2           Manuals 10/28 10/31           Shoulder ROM             Soft Tissue Deformation GJL R subscap/posterior RTC            Lateral Epicondylitis MWM             Manual C/s retraction  GJL 10x           Manual C/s retraction + ext  GJL 10x                        Assessment  GJL           Neuro Re-Ed             Supine chin tuck with serratus reach 10x5\" HEP Held           Row  Plum Tube 2x15           LPD  Peach Tube 2x15           Prone T  Stand no resist 2x10           Horizontal abduction             Wall Slide LT Lift             Eccentric wrist extension 2# 2x10 HEP HEP           Isometrics ER/  Abd NV?            ER  [place] Tube 3x10                                                  Ther Ex             UBE  " 2'/2'           Thoracic extension chair  NV           Thoracic ext supine foam roll  NV           Self STM Posterior RTC/subscap 2' ball on wall HEP            Seated c/s retraction + extension  3x10 HEP                                                               Ther Activity                                       Gait Training                                       Modalities

## 2024-11-05 ENCOUNTER — OFFICE VISIT (OUTPATIENT)
Dept: PHYSICAL THERAPY | Facility: OTHER | Age: 49
End: 2024-11-05
Payer: COMMERCIAL

## 2024-11-05 ENCOUNTER — HOSPITAL ENCOUNTER (OUTPATIENT)
Dept: MRI IMAGING | Facility: HOSPITAL | Age: 49
Discharge: HOME | End: 2024-11-05
Attending: ORTHOPAEDIC SURGERY
Payer: COMMERCIAL

## 2024-11-05 DIAGNOSIS — M75.81 ROTATOR CUFF TENDONITIS, RIGHT: Primary | ICD-10-CM

## 2024-11-05 DIAGNOSIS — M77.11 LATERAL EPICONDYLITIS OF RIGHT ELBOW: ICD-10-CM

## 2024-11-05 DIAGNOSIS — M75.51 SUBACROMIAL BURSITIS OF RIGHT SHOULDER JOINT: ICD-10-CM

## 2024-11-05 DIAGNOSIS — M75.81 ROTATOR CUFF TENDONITIS, RIGHT: ICD-10-CM

## 2024-11-05 PROCEDURE — 97110 THERAPEUTIC EXERCISES: CPT

## 2024-11-05 PROCEDURE — 97112 NEUROMUSCULAR REEDUCATION: CPT

## 2024-11-05 PROCEDURE — 73221 MRI JOINT UPR EXTREM W/O DYE: CPT

## 2024-11-05 PROCEDURE — 97140 MANUAL THERAPY 1/> REGIONS: CPT

## 2024-11-05 NOTE — PROGRESS NOTES
"Daily Note     Today's date: 2024  Patient name: Sharon Patel  : 1975  MRN: [id number]  Referring provider: Norberto Jalloh*  Dx:   Encounter Diagnosis     ICD-10-CM    1. Rotator cuff tendonitis, right  M75.81       2. Subacromial bursitis of right shoulder joint  M75.51       3. Lateral epicondylitis of right elbow  M77.11           Start Time: 0720  Stop Time: 0800  Total time in clinic (min): 40 minutes  1 on 1 24 minutes all else IEP     Subjective: Patient reports her symptoms are improving and that she has been using her inversion table which she feels also has been helping.       Objective: See treatment diary below    R Shoulder AROM Normalizing all planes    Assessment: Tolerated treatment well with continued focus on symptom management, thoracic mobility, and scapular/RTC strengthening/neuromuscular re-education. No adverse reactions to treatment, patient felt improvement post-session. Patient demonstrated fatigue post treatment, exhibited good technique with therapeutic exercises, and would benefit from continued PT.      Plan: Continue per plan of care.      Precautions: Asthma.    POC expires Unit limit Auth Expiration date PT/OT + Visit Limit?   25 BOMN N/A 30 PCY                             Visit 1 IE 2 3          Manuals 10/28 10/31 11/5          Shoulder ROM             Soft Tissue Deformation GJL R subscap/posterior RTC  GJL posterior RTC          Lateral Epicondylitis MWM             Manual C/s retraction  GJL 10x           Manual C/s retraction + ext  GJL 10x GJL 20x                       Assessment  GJL           Neuro Re-Ed             Supine chin tuck with serratus reach 10x5\" HEP Held           Row  Plum Tube 2x15 Plum Tube 30x5\"           LPD  [place]ach Tube 2x15 Orange Tube 30x5\"           Band W   Peach Tube 2x8          Prone T  Stand no resist 2x10           Horizontal abduction             Wall Slide LT Lift             Eccentric wrist extension 2# 2x10 HEP HEP    " "       Isometrics ER/  Abd NV?            ER  [place] Tube 3x10 Green Tube 3x10          Body [place]   AP/ ML at side 3x20\"                                                  Ther Ex             UBE  2'/2'           Thoracic extension chair  NV           Thoracic ext supine foam roll  NV 3'          Self STM Posterior RTC/subscap 2' ball on wall HEP  Rev HEP          Seated c/s retraction + extension  3x10 HEP 2x10 Rev HEP                                                              Ther Activity                                       Gait Training                                       Modalities                                            "

## 2024-11-07 ENCOUNTER — OFFICE VISIT (OUTPATIENT)
Dept: PHYSICAL THERAPY | Facility: OTHER | Age: 49
End: 2024-11-07
Payer: COMMERCIAL

## 2024-11-07 DIAGNOSIS — M75.51 SUBACROMIAL BURSITIS OF RIGHT SHOULDER JOINT: ICD-10-CM

## 2024-11-07 DIAGNOSIS — M75.81 ROTATOR CUFF TENDONITIS, RIGHT: Primary | ICD-10-CM

## 2024-11-07 DIAGNOSIS — M77.11 LATERAL EPICONDYLITIS OF RIGHT ELBOW: ICD-10-CM

## 2024-11-07 PROCEDURE — 97140 MANUAL THERAPY 1/> REGIONS: CPT

## 2024-11-07 PROCEDURE — 97112 NEUROMUSCULAR REEDUCATION: CPT

## 2024-11-07 PROCEDURE — 97110 THERAPEUTIC EXERCISES: CPT

## 2024-11-07 NOTE — PROGRESS NOTES
"Daily Note     Today's date: 2024  Patient name: Sharon Patel  : 1975  MRN: [id number]  Referring provider: Norberto Jalloh*  Dx:   Encounter Diagnosis     ICD-10-CM    1. Rotator cuff tendonitis, right  M75.81       2. Subacromial bursitis of right shoulder joint  M75.51       3. Lateral epicondylitis of right elbow  M77.11           Start Time: 1605  Stop Time: 1640  Total time in clinic (min): 35 minutes  1 on 1 24 minutes all else IEP     Subjective: Patient reports her shoulder is doing better but does have forearm pain.     Objective: See treatment diary below    R Shoulder AROM Normalizing all planes  R Radial N. Slight ANTT - improved following nerve glide    Assessment: Tolerated treatment well with continued focus on symptom management, thoracic mobility, and scapular/RTC strengthening/neuromuscular re-education. Added radial nerve glide to patient HEP. Patient demonstrated fatigue post treatment, exhibited good technique with therapeutic exercises, and would benefit from continued PT.    Plan: Continue per plan of care.      Precautions: Asthma.    POC expires Unit limit Auth Expiration date PT/OT + Visit Limit?   25 BOMN N/A 30 PCY                             Visit 1 IE 2 3 4         Manuals 10/28 10/31 11/5 11/7         Shoulder ROM             Soft Tissue Deformation GJL R subscap/posterior RTC  GJL posterior RTC GJL posterior RTC         Lateral Epicondylitis MWM             Manual C/s retraction  GJL 10x           Manual C/s retraction + ext  GJL 10x GJL 20x GJL 20x                      Assessment  GJL           Neuro Re-Ed             Supine chin tuck with serratus reach 10x5\" HEP Held           Row  Plum Tube 2x15 Plum Tube 30x5\"  Plum Tube 30x5\"          LPD  Peach Tube 2x15 Orange Tube 30x5\"  Orange Tube 30x5\"          Band W   Peach Tube 2x8 Peach Tube 2x10         Prone T  Stand no resist 2x10           Horizontal abduction             Wall Slide LT Lift           " "  Eccentric wrist extension 2# 2x10 HEP HEP           Isometrics ER/  Abd NV?            ER  [place]ley Tube 3x10 Green Tube 3x10 Green Tube 3x10         Body Blade   AP/ ML at side 3x20\"  AP/ ML at side 3x20\"          Radial N. Glide    3x10 HEP                                   Ther Ex             UBE  2'/2'           Thoracic extension chair  NV           Thoracic ext supine foam roll  NV 3' 3'         Self STM Posterior RTC/subscap 2' ball on wall HEP  Rev HEP Rev HEP         Seated c/s retraction + extension  3x10 HEP 2x10 Rev HEP Rev HEP                                                             Ther Activity                                       Gait Training                                       Modalities                                            "

## 2024-11-11 ENCOUNTER — OFFICE VISIT (OUTPATIENT)
Dept: PHYSICAL THERAPY | Facility: OTHER | Age: 49
End: 2024-11-11
Payer: COMMERCIAL

## 2024-11-11 DIAGNOSIS — M75.51 SUBACROMIAL BURSITIS OF RIGHT SHOULDER JOINT: ICD-10-CM

## 2024-11-11 DIAGNOSIS — M75.81 ROTATOR CUFF TENDONITIS, RIGHT: Primary | ICD-10-CM

## 2024-11-11 DIAGNOSIS — M77.11 LATERAL EPICONDYLITIS OF RIGHT ELBOW: ICD-10-CM

## 2024-11-11 PROCEDURE — 97112 NEUROMUSCULAR REEDUCATION: CPT

## 2024-11-11 PROCEDURE — 97110 THERAPEUTIC EXERCISES: CPT

## 2024-11-11 PROCEDURE — 97140 MANUAL THERAPY 1/> REGIONS: CPT

## 2024-11-11 NOTE — PROGRESS NOTES
"Daily Note     Today's date: 2024  Patient name: Sharon Patel  : 1975  MRN: [id number]  Referring provider: Norberto Jalloh*  Dx:   Encounter Diagnosis     ICD-10-CM    1. Rotator cuff tendonitis, right  M75.81       2. Subacromial bursitis of right shoulder joint  M75.51       3. Lateral epicondylitis of right elbow  M77.11           Start Time: 1600  Stop Time: 1640  Total time in clinic (min): 40 minutes    Subjective: Patient reports doing well, but does report concerns over recent MRI results.    Objective: See treatment diary below    R Shoulder AROM Normalizing all planes with slight deficits/symptoms persisting with functional internal rotation behind the back    Assessment: Tolerated treatment well with continued focus on scapular/RTC strengthening/neuromuscular re-education. Provided patient with updated HEP and corresponding bands. Educated patient on importance of continued strength/function despite MRI findings and that Dr. Jalloh will discuss more in detail with her at her follow-up. Patient demonstrated fatigue post treatment, exhibited good technique with therapeutic exercises, and would benefit from continued PT.    Plan: Continue per plan of care.      Precautions: Asthma.    HEP Access Code [email or web address]     POC expires Unit limit Auth Expiration date PT/OT + Visit Limit?   25 BOMN N/A 30 PCY                             Visit 1 IE 2 3 4 5        Manuals 10/28 10/31 11/5 11/7 11/11        Shoulder ROM             Soft Tissue Deformation GJL R subscap/posterior RTC  GJL posterior RTC GJL posterior RTC         Lateral Epicondylitis MWM             Manual C/s retraction  GJL 10x           Manual C/s retraction + ext  GJL 10x GJL 20x GJL 20x                      Assessment  GJL   GJL        Neuro Re-Ed             Supine chin tuck with serratus reach 10x5\" HEP Held           Row  Plum Tube 2x15 Plum Tube 30x5\"  Plum Tube 30x5\"  Plum TB 30x5\" HEP        LPD  Peach Tube 2x15 Orange " "Tube 30x5\"  Orange Tube 30x5\"  Peach TB 30x5\" HEP        Band W   Peach Tube 2x8 Peach Tube 2x10         Prone T  Stand no resist 2x10           Horizontal abduction             Wall Slide LT Lift             Eccentric wrist extension 2# 2x10 HEP HEP   Rev HEP        Isometrics ER/  Abd NV?            ER  Nicholas Tube 3x10 Green Tube 3x10 Green Tube 3x10 Orange TB 3x10 HEP        Body Blade   AP/ ML at side 3x20\"  AP/ ML at side 3x20\"          Radial N. Glide    3x10 HEP Rev HEP                                               Ther Ex             UBE  2'/2'           Thoracic extension chair  NV           Thoracic ext supine foam roll  NV 3' 3'         Self STM Posterior RTC/subscap 2' ball on wall HEP  Rev HEP Rev HEP         Seated c/s retraction + extension  3x10 HEP 2x10 Rev HEP Rev HEP         Flexion AROM      1# to 90 3x10 HEP                                                            Ther Activity                                       Gait Training                                       Modalities                                            "

## 2024-11-13 ENCOUNTER — APPOINTMENT (OUTPATIENT)
Dept: PHYSICAL THERAPY | Facility: OTHER | Age: 49
End: 2024-11-13
Payer: COMMERCIAL

## 2024-11-13 DIAGNOSIS — M75.51 SUBACROMIAL BURSITIS OF RIGHT SHOULDER JOINT: ICD-10-CM

## 2024-11-13 DIAGNOSIS — M75.81 ROTATOR CUFF TENDONITIS, RIGHT: Primary | ICD-10-CM

## 2024-11-13 DIAGNOSIS — M77.11 LATERAL EPICONDYLITIS OF RIGHT ELBOW: ICD-10-CM

## 2024-11-13 NOTE — PROGRESS NOTES
"Daily Note     Today's date: 2024  Patient name: Sharon Patel  : 1975  MRN: [id number]  Referring provider: Norberto Jalloh*  Dx:   Encounter Diagnosis     ICD-10-CM    1. Rotator cuff tendonitis, right  M75.81       2. Subacromial bursitis of right shoulder joint  M75.51       3. Lateral epicondylitis of right elbow  M77.11                      Subjective:   LV: Patient reports doing well, but does report concerns over recent MRI results.    Objective: See treatment diary below  LV:   R Shoulder AROM Normalizing all planes with slight deficits/symptoms persisting with functional internal rotation behind the back    Assessment:   LV: Tolerated treatment well with continued focus on scapular/RTC strengthening/neuromuscular re-education. Provided patient with updated HEP and corresponding bands. Educated patient on importance of continued strength/function despite MRI findings and that Dr. Jalloh will discuss more in detail with her at her follow-up. Patient demonstrated fatigue post treatment, exhibited good technique with therapeutic exercises, and would benefit from continued PT.    Plan: Continue per plan of care.      Precautions: Asthma.    HEP Access Code [email or web address]     POC expires Unit limit Auth Expiration date PT/OT + Visit Limit?   25 BOMN N/A 30 PCY                             Visit 1 IE 2 3 4 5 6       Manuals 10/28 10/31 11/5 11/7 11/11 11/13       Shoulder ROM             Soft Tissue Deformation GJL R subscap/posterior RTC  GJL posterior RTC GJL posterior RTC         Lateral Epicondylitis MWM             Manual C/s retraction  GJL 10x           Manual C/s retraction + ext  GJL 10x GJL 20x GJL 20x                      Assessment  GJL   GJL        Neuro Re-Ed             Supine chin tuck with serratus reach 10x5\" HEP Held           Row  Plum Tube 2x15 Plum Tube 30x5\"  Plum Tube 30x5\"  Plum TB 30x5\" HEP        LPD  Peach Tube 2x15 Orange Tube 30x5\"  Orange Tube 30x5\"  Peach TB " "30x5\" HEP        Band W   Peach Tube 2x8 Peach Tube 2x10         Prone T  Stand no resist 2x10           Horizontal abduction             Wall Slide LT Lift             Eccentric wrist extension 2# 2x10 HEP HEP   Rev HEP        Isometrics ER/  Abd NV?            ER  Foster Tube 3x10 Green Tube 3x10 Green Tube 3x10 Orange TB 3x10 HEP        Body Blade   AP/ ML at side 3x20\"  AP/ ML at side 3x20\"          Radial N. Glide    3x10 HEP Rev HEP                                               Ther Ex             UBE  2'/2'           Thoracic extension chair  NV           Thoracic ext supine foam roll  NV 3' 3'         Self STM Posterior RTC/subscap 2' ball on wall HEP  Rev HEP Rev HEP         Seated c/s retraction + extension  3x10 HEP 2x10 Rev HEP Rev HEP         Flexion AROM      1# to 90 3x10 HEP                                                            Ther Activity                                       Gait Training                                       Modalities                                            "

## 2024-11-14 ENCOUNTER — OFFICE VISIT (OUTPATIENT)
Dept: OBGYN CLINIC | Facility: OTHER | Age: 49
End: 2024-11-14
Payer: COMMERCIAL

## 2024-11-14 VITALS
HEART RATE: 66 BPM | DIASTOLIC BLOOD PRESSURE: 67 MMHG | SYSTOLIC BLOOD PRESSURE: 116 MMHG | BODY MASS INDEX: 26.92 KG/M2 | HEIGHT: 65 IN | WEIGHT: 161.6 LBS

## 2024-11-14 DIAGNOSIS — M75.81 ROTATOR CUFF TENDONITIS, RIGHT: Primary | ICD-10-CM

## 2024-11-14 DIAGNOSIS — M75.101 NONTRAUMATIC TEAR OF RIGHT SUPRASPINATUS TENDON: ICD-10-CM

## 2024-11-14 DIAGNOSIS — M24.111 DEGENERATIVE TEAR OF GLENOID LABRUM OF RIGHT SHOULDER: ICD-10-CM

## 2024-11-14 DIAGNOSIS — M75.51 SUBACROMIAL BURSITIS OF RIGHT SHOULDER JOINT: ICD-10-CM

## 2024-11-14 PROCEDURE — 99214 OFFICE O/P EST MOD 30 MIN: CPT | Performed by: ORTHOPAEDIC SURGERY

## 2024-11-14 NOTE — LETTER
November 14, 2024     Shahzad Hernandez, PT  [address] [place] [address]    Patient: Sharon Patel   YOB: 1975   Date of Visit: 11/14/2024       Dear Dr. Hernandez:    Thank you for referring Sharon Patel to me for evaluation. Below are my notes for this consultation.    If you have questions, please do not hesitate to call me. I look forward to following your patient along with you.         Sincerely,        Norberto Jalloh MD        CC: No Recipients    Norberto Jalloh MD  11/14/2024  8:35 AM  Sign when Signing Visit    Assessment  Diagnoses and all orders for this visit:    Rotator cuff tendonitis, right    Subacromial bursitis of right shoulder joint    Partial-thickness articular sided shallow supraspinatus/infraspinatus tear    Degenerative labral tear    Discussion and Plan:    Explained to the patient that her MRI does not show a full thickness tear of the rotator cuff. There is some degeneration of her labrum and undersurface fraying of the junction of the supraspinatus/infraspinatus tendon which is very common finding in 1 that does not typically require surgical intervention. The pathoanatomy and natural history of this diagnosis was explained to the patient today in the office.   Both operative and non operative treatments were discussed with the patient today in the office. Based on her MRI findings, I recommend she continues with non operative treatments as she is noting improvements.  I did offer her a trial of a corticosteroid injection which she politely declined as she states they have not worked in the past.  She understood agreed with the plan and all questions were answered  If her symptoms persist or worsen then we could consider an arthroscopy with debridement versus repair based on intraoperative findings, I am hopeful she can avoid this in the recovery associated with but we will adjust based on her progress or lack thereof in physical therapy..   Follow  "up in 6 weeks or sooner if she feels she requires further intervention, happy to also schedule arthroscopy over the phone as we did discuss this in detail in the office today.    Subjective:   Patient ID: Sharon Patel is a 49 y.o. female presents today for a follow up visit for her right shoulder as well as to discuss MRI results. Patient has been treating for rotator cuff tendinitis/bursitis. Today, she reports that her symptoms/pain have improved since her last visit and beginning PT/HEP but she does note some residual daily \"burning/tearing, aching\" pain about the anterolateral aspect of her shoulder with certain daily activities. Pain is worse with overhead and repetitive lifting. No numbness or tingling. No fevers or chills.       The following portions of the patient's history were reviewed and updated as appropriate: allergies, current medications, past family history, past medical history, past social history, past surgical history and problem list.    Objective:  /67   Pulse 66   Ht 5' 5\" (1.651 m)   Wt 73.3 kg (161 lb 9.6 oz)   BMI 26.89 kg/m²       Right Shoulder Exam     Range of Motion   The patient has normal right shoulder ROM.    Muscle Strength   Abduction: 5/5   External rotation: 5/5     Tests   Patel test: positive  Drop arm: negative    Other   Erythema: absent  Sensation: normal  Pulse: present            Physical Exam  Constitutional:       Appearance: She is well-developed.   Eyes:      Pupils: Pupils are equal, round, and reactive to light.   Pulmonary:      Effort: Pulmonary effort is normal.      Breath sounds: Normal breath sounds.   Skin:     General: Skin is warm and dry.   Neurological:      Mental Status: She is alert and oriented to person, place, and time.   Psychiatric:         Behavior: Behavior normal.         Thought Content: Thought content normal.         Judgment: Judgment normal.       I have personally reviewed pertinent films in PACS and my interpretation is as " follows.    MRI Right Shoulder 11/5/24: Shallow (2mm) partial thickness tearing/undersurface fraying of the articular surface of the infraspinatus tendon. Degeneration of the posterior superior labrum, small adjacent chondral defect posterior superior glenoid.       Records Reviewed: office notes from physical therapy    Scribe Attestation      I,:  Gasper Randle am acting as a scribe while in the presence of the attending physician.:       I,:  Norberto Jalloh MD personally performed the services described in this documentation    as scribed in my presence.:

## 2024-11-14 NOTE — PROGRESS NOTES
"  Assessment  Diagnoses and all orders for this visit:    Rotator cuff tendonitis, right    Subacromial bursitis of right shoulder joint    Partial-thickness articular sided shallow supraspinatus/infraspinatus tear    Degenerative labral tear    Discussion and Plan:    Explained to the patient that her MRI does not show a full thickness tear of the rotator cuff. There is some degeneration of her labrum and undersurface fraying of the junction of the supraspinatus/infraspinatus tendon which is very common finding in 1 that does not typically require surgical intervention. The pathoanatomy and natural history of this diagnosis was explained to the patient today in the office.   Both operative and non operative treatments were discussed with the patient today in the office. Based on her MRI findings, I recommend she continues with non operative treatments as she is noting improvements.  I did offer her a trial of a corticosteroid injection which she politely declined as she states they have not worked in the past.  She understood agreed with the plan and all questions were answered  If her symptoms persist or worsen then we could consider an arthroscopy with debridement versus repair based on intraoperative findings, I am hopeful she can avoid this in the recovery associated with but we will adjust based on her progress or lack thereof in physical therapy..   Follow up in 6 weeks or sooner if she feels she requires further intervention, happy to also schedule arthroscopy over the phone as we did discuss this in detail in the office today.    Subjective:   Patient ID: Sharon Patel is a 49 y.o. female presents today for a follow up visit for her right shoulder as well as to discuss MRI results. Patient has been treating for rotator cuff tendinitis/bursitis. Today, she reports that her symptoms/pain have improved since her last visit and beginning PT/HEP but she does note some residual daily \"burning/tearing, aching\" pain " "about the anterolateral aspect of her shoulder with certain daily activities. Pain is worse with overhead and repetitive lifting. No numbness or tingling. No fevers or chills.       The following portions of the patient's history were reviewed and updated as appropriate: allergies, current medications, past family history, past medical history, past social history, past surgical history and problem list.    Objective:  /67   Pulse 66   Ht 5' 5\" (1.651 m)   Wt 73.3 kg (161 lb 9.6 oz)   BMI 26.89 kg/m²       Right Shoulder Exam     Range of Motion   The patient has normal right shoulder ROM.    Muscle Strength   Abduction: 5/5   External rotation: 5/5     Tests   Patel test: positive  Drop arm: negative    Other   Erythema: absent  Sensation: normal  Pulse: present            Physical Exam  Constitutional:       Appearance: She is well-developed.   Eyes:      Pupils: Pupils are equal, round, and reactive to light.   Pulmonary:      Effort: Pulmonary effort is normal.      Breath sounds: Normal breath sounds.   Skin:     General: Skin is warm and dry.   Neurological:      Mental Status: She is alert and oriented to person, place, and time.   Psychiatric:         Behavior: Behavior normal.         Thought Content: Thought content normal.         Judgment: Judgment normal.       I have personally reviewed pertinent films in PACS and my interpretation is as follows.    MRI Right Shoulder 11/5/24: Shallow (2mm) partial thickness tearing/undersurface fraying of the articular surface of the infraspinatus tendon. Degeneration of the posterior superior labrum, small adjacent chondral defect posterior superior glenoid.       Records Reviewed: office notes from physical therapy    Scribe Attestation      I,:  Gasper Randle am acting as a scribe while in the presence of the attending physician.:       I,:  Norberto Jalloh MD personally performed the services described in this documentation    as scribed in " my presence.:

## 2024-11-20 ENCOUNTER — OFFICE VISIT (OUTPATIENT)
Dept: PHYSICAL THERAPY | Facility: OTHER | Age: 49
End: 2024-11-20
Payer: COMMERCIAL

## 2024-11-20 DIAGNOSIS — M77.11 LATERAL EPICONDYLITIS OF RIGHT ELBOW: ICD-10-CM

## 2024-11-20 DIAGNOSIS — M75.51 SUBACROMIAL BURSITIS OF RIGHT SHOULDER JOINT: ICD-10-CM

## 2024-11-20 DIAGNOSIS — M75.81 ROTATOR CUFF TENDONITIS, RIGHT: Primary | ICD-10-CM

## 2024-11-20 PROCEDURE — 97112 NEUROMUSCULAR REEDUCATION: CPT

## 2024-11-20 PROCEDURE — 97110 THERAPEUTIC EXERCISES: CPT

## 2024-11-20 NOTE — PROGRESS NOTES
"Daily Note     Today's date: 2024  Patient name: Sharon Patel  : 1975  MRN: [id number]  Referring provider: Norberto Jalloh*  Dx:   Encounter Diagnosis     ICD-10-CM    1. Rotator cuff tendonitis, right  M75.81       2. Subacromial bursitis of right shoulder joint  M75.51       3. Lateral epicondylitis of right elbow  M77.11           Start Time: 0745  Stop Time: 814  Total time in clinic (min): 29 minutes  1 on 1 16 minutes all else IEP     Subjective: Patient reports her shoulder is gradually improving, but still painful with certain movements - combined abduction/internal rotation as when going to give a friend a hug.    Objective: See treatment diary below    Assessment: Tolerated treatment well with continued focus on scapular/RTC strengthening/neuromuscular re-education. Provided patient with updated HEP and corresponding bands. Educated patient on importance of continued strengthening/function. Patient demonstrated fatigue post treatment, exhibited good technique with therapeutic exercises, and would benefit from continued PT.    Plan: Continue per plan of care.      Precautions: Asthma.    HEP Access Code [email or web address]     POC expires Unit limit Auth Expiration date PT/OT + Visit Limit?   25 BOMN N/A 30 PCY                             Visit 1 IE 2 3 4 5 6       Manuals 10/28 10/31 11/5 11/7 11/11 11/20       Shoulder ROM             Soft Tissue Deformation GJL R subscap/posterior RTC  GJL posterior RTC GJL posterior RTC         Lateral Epicondylitis MWM             Manual C/s retraction  GJL 10x           Manual C/s retraction + ext  GJL 10x GJL 20x GJL 20x                      Assessment  GJL   GJL        Neuro Re-Ed             Supine chin tuck with serratus reach 10x5\" HEP Held           Row  Plum Tube 2x15 Plum Tube 30x5\"  Plum Tube 30x5\"  Plum TB 30x5\" HEP HEP       LPD  Peach Tube 2x15 Orange Tube 30x5\"  Orange Tube 30x5\"  Peach TB 30x5\" HEP HEP       Band W   Peach Tube 2x8 " "Peach Tube 2x10         Prone T  Stand no resist 2x10           Horizontal abduction             Wall Slide LT Lift             Eccentric wrist extension 2# 2x10 HEP HEP   Rev HEP        Isometrics ER/  Abd NV?            ER  [place] Tube 3x10 Green Tube 3x10 Green Tube 3x10 Orange TB 3x10 HEP HEP       Body Blade   AP/ ML at side 3x20\"  AP/ ML at side 3x20\"          Radial N. Glide    3x10 HEP Rev HEP        Band B/L ER loop      2x10x5\" HEP       Band B/L ER scoop      2x5 HEP       Band B/L ER flexion      2x5 HEP                                                           Ther Ex             UBE  2'/2'    2'/2'       Thoracic extension chair  NV           Thoracic ext supine foam roll  NV 3' 3'         Self STM Posterior RTC/subscap 2' ball on wall HEP  Rev HEP Rev HEP         Seated c/s retraction + extension  3x10 HEP 2x10 Rev HEP Rev HEP         Flexion AROM      1# to 90 3x10 HEP HEP       Half kneel KB 90/90       5# KB 5x                                              Ther Activity                                       Gait Training                                       Modalities                                            "

## 2024-11-25 ENCOUNTER — APPOINTMENT (OUTPATIENT)
Dept: PHYSICAL THERAPY | Facility: OTHER | Age: 49
End: 2024-11-25
Payer: COMMERCIAL

## 2024-11-27 ENCOUNTER — OFFICE VISIT (OUTPATIENT)
Dept: PHYSICAL THERAPY | Facility: OTHER | Age: 49
End: 2024-11-27
Payer: COMMERCIAL

## 2024-11-27 DIAGNOSIS — M75.81 ROTATOR CUFF TENDONITIS, RIGHT: Primary | ICD-10-CM

## 2024-11-27 DIAGNOSIS — M75.51 SUBACROMIAL BURSITIS OF RIGHT SHOULDER JOINT: ICD-10-CM

## 2024-11-27 DIAGNOSIS — M77.11 LATERAL EPICONDYLITIS OF RIGHT ELBOW: ICD-10-CM

## 2024-11-27 PROCEDURE — 97110 THERAPEUTIC EXERCISES: CPT

## 2024-11-27 PROCEDURE — 97112 NEUROMUSCULAR REEDUCATION: CPT

## 2024-11-27 NOTE — PROGRESS NOTES
"Daily Note     Today's date: 2024  Patient name: Sharon Patel  : 1975  MRN: [id number]  Referring provider: Norberto Jalloh*  Dx:   Encounter Diagnosis     ICD-10-CM    1. Rotator cuff tendonitis, right  M75.81       2. Subacromial bursitis of right shoulder joint  M75.51       3. Lateral epicondylitis of right elbow  M77.11           Start Time: 1600  Stop Time: 1635  Total time in clinic (min): 35 minutes  1 on 1 16 minutes all else IEP     Subjective: Patient reports her shoulder continues to feel better, but does report some pain with carrying a couple cases of water and pushing up from her bed with her right UE.    Objective: See treatment diary below    Assessment: Tolerated treatment well with continued focus on scapular/RTC strengthening/neuromuscular re-education. No adverse reactions to treatment - adequate fatigue noted throughout. Progress sets/reps/weights as tolerated. Patient demonstrated fatigue post treatment, exhibited good technique with therapeutic exercises, and would benefit from continued PT.    Plan: Continue per plan of care.      Precautions: Asthma.    HEP Access Code [email or web address]     POC expires Unit limit Auth Expiration date PT/OT + Visit Limit?   25 BOMN N/A 30 PCY                             Visit 1 IE 2 3 4 5 6 7      Manuals 10/28 10/31 11/5 11/7 11/11 11/20 11/27      Shoulder ROM             Soft Tissue Deformation GJL R subscap/posterior RTC  GJL posterior RTC GJL posterior RTC         Lateral Epicondylitis MWM             Manual C/s retraction  GJL 10x           Manual C/s retraction + ext  GJL 10x GJL 20x GJL 20x                      Assessment  GJL   GJL        Neuro Re-Ed             Supine chin tuck with serratus reach 10x5\" HEP Held           Row  Plum Tube 2x15 Plum Tube 30x5\"  Plum Tube 30x5\"  Plum TB 30x5\" HEP HEP       LPD  Peach Tube 2x15 Orange Tube 30x5\"  Orange Tube 30x5\"  Peach TB 30x5\" HEP HEP       Band W   Peach Tube 2x8 Peach Tube 2x10    " "     Prone T  Stand no resist 2x10           Horizontal abduction             Wall Slide LT Lift             Eccentric wrist extension 2# 2x10 HEP HEP   Rev HEP        Isometrics ER/  Abd NV?            ER  [place] Tube 3x10 Green Tube 3x10 Green Tube 3x10 Orange TB 3x10 HEP HEP       Body Blade   AP/ ML at side 3x20\"  AP/ ML at side 3x20\"    AP/ ML at side 3x20\"       Radial N. Glide    3x10 HEP Rev HEP        Band B/L ER loop      2x10x5\" HEP PTB 2x10x5\"      Band B/L ER scoop      2x5 HEP PTB 2x10      Band B/L ER flexion      2x5 HEP PTB 2x10      SL ER       1# 3x10      ER 45 deg flexion       1# 3x8                                Ther Ex             UBE  2'/2'    2'/2' 2'/2'      Thoracic extension chair  NV           Thoracic ext supine foam roll  NV 3' 3'         Self STM Posterior RTC/subscap 2' ball on wall HEP  Rev HEP Rev HEP         Seated c/s retraction + extension  3x10 HEP 2x10 Rev HEP Rev HEP         Flexion AROM      1# to 90 3x10 HEP HEP       Half kneel KB 90/90       5# KB 5x                                              Ther Activity                                       Gait Training                                       Modalities                                            "

## 2024-12-02 ENCOUNTER — OFFICE VISIT (OUTPATIENT)
Dept: PHYSICAL THERAPY | Facility: OTHER | Age: 49
End: 2024-12-02
Payer: COMMERCIAL

## 2024-12-02 DIAGNOSIS — M77.11 LATERAL EPICONDYLITIS OF RIGHT ELBOW: ICD-10-CM

## 2024-12-02 DIAGNOSIS — M75.51 SUBACROMIAL BURSITIS OF RIGHT SHOULDER JOINT: ICD-10-CM

## 2024-12-02 DIAGNOSIS — M75.81 ROTATOR CUFF TENDONITIS, RIGHT: Primary | ICD-10-CM

## 2024-12-02 PROCEDURE — 97110 THERAPEUTIC EXERCISES: CPT

## 2024-12-02 PROCEDURE — 97112 NEUROMUSCULAR REEDUCATION: CPT

## 2024-12-02 PROCEDURE — 97140 MANUAL THERAPY 1/> REGIONS: CPT

## 2024-12-02 NOTE — PROGRESS NOTES
"Daily Note     Today's date: 2024  Patient name: Sharon Patel  : 1975  MRN: [id number]  Referring provider: Norberto Jalloh*  Dx:   Encounter Diagnosis     ICD-10-CM    1. Rotator cuff tendonitis, right  M75.81       2. Subacromial bursitis of right shoulder joint  M75.51       3. Lateral epicondylitis of right elbow  M77.11           Start Time: 0748  Stop Time: 08  Total time in clinic (min): 42 minutes    Subjective: Patient reports her shoulder was doing well, but did feel it flare up in bed last night when she went to push up from the bed with her right arm.     Objective: See treatment diary below    Radial/Median N. ANTT - improved following c/spine retraction + extension    Assessment: Tolerated treatment well. Patient demonstrated improvements in shoulder mobility/symptoms following cervical/thoracic mobility and nerve glides - added to HEP with continued education on strengthening. Patient demonstrated fatigue post treatment, exhibited good technique with therapeutic exercises, and would benefit from continued PT.    Plan: Continue per plan of care.      Precautions: Asthma.    HEP Access Code [email or web address]     POC expires Unit limit Auth Expiration date PT/OT + Visit Limit?   25 BOMN N/A 30 PCY                             Visit 1 IE 2 3 4 5 6 7 8     Manuals 10/28 10/31 11/5 11/7 11/11 11/20 11/27 12     Shoulder ROM             Soft Tissue Deformation GJL R subscap/posterior RTC  GJL posterior RTC GJL posterior RTC         Lateral Epicondylitis MWM             Manual C/s retraction  GJL 10x      GJL 2x10     Manual C/s retraction + ext  GJL 10x GJL 20x GJL 20x    GJL 10x                  Assessment  GJL   GJL   GJL     Neuro Re-Ed             Supine chin tuck with serratus reach 10x5\" HEP Held           Row  Plum Tube 2x15 Plum Tube 30x5\"  Plum Tube 30x5\"  Plum TB 30x5\" HEP HEP       LPD  Peach Tube 2x15 Orange Tube 30x5\"  Orange Tube 30x5\"  Peach TB 30x5\" HEP HEP       Band W   " "Peach Tube 2x8 Peach Tube 2x10         Prone T  Stand no resist 2x10           Horizontal abduction             Wall Slide LT Lift             Eccentric wrist extension 2# 2x10 HEP HEP   Rev HEP        Isometrics ER/  Abd NV?            ER  [place] Tube 3x10 Green Tube 3x10 Green Tube 3x10 Orange TB 3x10 HEP HEP       Body Blade   AP/ ML at side 3x20\"  AP/ ML at side 3x20\"    AP/ ML at side 3x20\"       Radial N. Glide    3x10 HEP Rev HEP   2x10 HEP     Band B/L ER loop      2x10x5\" HEP PTB 2x10x5\"      Band B/L ER scoop      2x5 HEP PTB 2x10      Band B/L ER flexion      2x5 HEP PTB 2x10      SL ER       1# 3x10      ER 45 deg flexion       1# 3x8      Prone T        10x5\"     Median N. [place]        2x10 HEP                  Ther Ex             UBE  2'/2'    2'/2' 2'/2' 2'/2'     Thoracic extension chair  NV           Prone t/spine ext + chin tuck        10x5\" HEP     Thoracic ext supine foam roll  NV 3' 3'         Self STM Posterior RTC/subscap 2' ball on wall HEP  Rev HEP Rev HEP         Seated c/s retraction + extension  3x10 HEP 2x10 Rev HEP Rev HEP    2x10 HEP      Flexion AROM      1# to 90 3x10 HEP HEP       Half kneel KB 90/90       5# KB 5x                                              Ther Activity                                       Gait Training                                       Modalities                                            "

## 2024-12-04 ENCOUNTER — APPOINTMENT (OUTPATIENT)
Dept: PHYSICAL THERAPY | Facility: OTHER | Age: 49
End: 2024-12-04
Payer: COMMERCIAL

## 2024-12-11 ENCOUNTER — OFFICE VISIT (OUTPATIENT)
Dept: PHYSICAL THERAPY | Facility: OTHER | Age: 49
End: 2024-12-11
Payer: COMMERCIAL

## 2024-12-11 DIAGNOSIS — M77.11 LATERAL EPICONDYLITIS OF RIGHT ELBOW: ICD-10-CM

## 2024-12-11 DIAGNOSIS — M75.81 ROTATOR CUFF TENDONITIS, RIGHT: Primary | ICD-10-CM

## 2024-12-11 DIAGNOSIS — M75.51 SUBACROMIAL BURSITIS OF RIGHT SHOULDER JOINT: ICD-10-CM

## 2024-12-11 PROCEDURE — 97112 NEUROMUSCULAR REEDUCATION: CPT

## 2024-12-11 PROCEDURE — 97140 MANUAL THERAPY 1/> REGIONS: CPT

## 2024-12-11 PROCEDURE — 97110 THERAPEUTIC EXERCISES: CPT

## 2024-12-11 NOTE — PROGRESS NOTES
"Daily Note     Today's date: 2024  Patient name: Sharon Patel  : 1975  MRN: [id number]  Referring provider: Norberto Jalloh*  Dx:   Encounter Diagnosis     ICD-10-CM    1. Rotator cuff tendonitis, right  M75.81       2. Subacromial bursitis of right shoulder joint  M75.51       3. Lateral epicondylitis of right elbow  M77.11           Start Time: 0735  Stop Time: 0815  Total time in clinic (min): 40 minutes    Subjective: Patient reports her shoulder was doing well up until she pulled herself up from her steering wheel in the car a couple days ago which flared up her symptoms.     Objective: See treatment diary below    Radial/Median N. Much improved this visit  Hypomobile into right shoulder IR 40 deg and ER 85 deg - improved to 50/90 respectively following manuals    Assessment: Tolerated treatment well. Patient demonstrated improvements in shoulder mobility/symptoms following manuals. Continued improvement in patient symptoms following scapular retraction. Patient demonstrated fatigue post treatment, exhibited good technique with therapeutic exercises, and would benefit from continued PT.    Plan: Continue per plan of care.      Precautions: Asthma.    HEP Access Code [email or web address]     POC expires Unit limit Auth Expiration date PT/OT + Visit Limit?   25 BOMN N/A 30 PCY                             Visit 1 IE 2 3 4 5 6 7 8 9    Manuals 10/28 10/31 11/5 11/7 11/11 11/20 11/27 12/2 12/11    Shoulder ROM         GJL    Soft Tissue Deformation GJL R subscap/posterior RTC  GJL posterior RTC GJL posterior RTC         Lateral Epicondylitis MWM             Manual C/s retraction  GJL 10x      GJL 2x10     Manual C/s retraction + ext  GJL 10x GJL 20x GJL 20x    GJL 10x                  Assessment  GJL   GJL   GJL GJL    Neuro Re-Ed             Supine chin tuck with serratus reach 10x5\" HEP Held           Row  Plum Tube 2x15 Plum Tube 30x5\"  Plum Tube 30x5\"  Plum TB 30x5\" HEP HEP       LPD  Peach Tube " "2x15 Orange Tube 30x5\"  Orange Tube 30x5\"  Peach TB 30x5\" HEP HEP       Band W   Peach Tube 2x8 Peach Tube 2x10         Prone T  Stand no resist 2x10           Horizontal abduction             Wall Slide LT Lift             Eccentric wrist extension 2# 2x10 HEP HEP   Rev HEP        Isometrics ER/  Abd NV?            ER  [place] Tube 3x10 Green Tube 3x10 Green Tube 3x10 Orange TB 3x10 HEP HEP       Body Blade   AP/ ML at side 3x20\"  AP/ ML at side 3x20\"    AP/ ML at side 3x20\"       Radial N. Glide    3x10 HEP Rev HEP   2x10 HEP 3x10    Band B/L ER loop      2x10x5\" HEP PTB 2x10x5\"      Band B/L ER scoop      2x5 HEP PTB 2x10      Band B/L ER flexion      2x5 HEP PTB 2x10      SL ER       1# 3x10  2# 7x, 1# 3x10    ER 45 deg flexion       1# 3x8      Prone T        10x5\" 4x5    Prone I         3x10     Median N. [place]        2x10 HEP                                                         Ther Ex             UBE  2'/2'    2'/2' 2'/2' 2'/2'     Thoracic extension chair  NV           Prone t/spine ext + chin tuck        10x5\" HEP 5x15\"     Thoracic ext supine foam roll  NV 3' 3'         Self STM Posterior RTC/subscap 2' ball on wall HEP  Rev HEP Rev HEP         Seated c/s retraction + extension  3x10 HEP 2x10 Rev HEP Rev HEP    2x10 HEP  3x10    Flexion AROM      1# to 90 3x10 HEP HEP       Half kneel KB 90/90       5# KB 5x       Sleeper stretch         5x5\" gentle    Cross body stretch         5x5\"                              Ther Activity                                       Gait Training                                       Modalities                                            "

## 2024-12-18 ENCOUNTER — OFFICE VISIT (OUTPATIENT)
Dept: PHYSICAL THERAPY | Facility: OTHER | Age: 49
End: 2024-12-18
Payer: COMMERCIAL

## 2024-12-18 DIAGNOSIS — M77.11 LATERAL EPICONDYLITIS OF RIGHT ELBOW: ICD-10-CM

## 2024-12-18 DIAGNOSIS — M75.81 ROTATOR CUFF TENDONITIS, RIGHT: Primary | ICD-10-CM

## 2024-12-18 DIAGNOSIS — M75.51 SUBACROMIAL BURSITIS OF RIGHT SHOULDER JOINT: ICD-10-CM

## 2024-12-18 PROCEDURE — 97112 NEUROMUSCULAR REEDUCATION: CPT

## 2024-12-18 PROCEDURE — 97110 THERAPEUTIC EXERCISES: CPT

## 2024-12-18 PROCEDURE — 97140 MANUAL THERAPY 1/> REGIONS: CPT

## 2024-12-18 NOTE — PROGRESS NOTES
"Daily Note     Today's date: 2024  Patient name: Sharon Patel  : 1975  MRN: [id number]  Referring provider: Norberto Jalloh*  Dx:   Encounter Diagnosis     ICD-10-CM    1. Rotator cuff tendonitis, right  M75.81       2. Subacromial bursitis of right shoulder joint  M75.51       3. Lateral epicondylitis of right elbow  M77.11           Start Time: 735  Stop Time: 817  Total time in clinic (min): 42 minutes    Subjective: Patient reports she went to lift a lawnmower the other day which left her shoulder sore and some burning along her posterior shoulder. Patient reports feeling much improved some days and other days the pain is still there.    Objective: See treatment diary below    Assessment: Tolerated treatment well. Patient demonstrated continued improvements in shoulder mobility/symptoms following manuals. Educated patient on continued HEP compliance to maximize her outcome. Patient to try HEP independently over the next 2 weeks and return if any major setback occurs. Patient demonstrated fatigue post treatment, exhibited good technique with therapeutic exercises, and would benefit from continued PT.    Plan: Continue per plan of care.      Precautions: Asthma.    HEP Access Code [email or web address]     POC expires Unit limit Auth Expiration date PT/OT + Visit Limit?   25 BOMN N/A 30 PCY                             Visit 1 IE 2 3 4 5 6 7 8 9 10   Manuals 10/28 10/31 11/5 11/7 11/11 11/20 11/27 12/2 12/11 12/18   Shoulder ROM         GJL GJL   Soft Tissue Deformation GJL R subscap/posterior RTC  GJL posterior RTC GJL posterior RTC      GJL post RTC   Lateral Epicondylitis MWM             Manual C/s retraction  GJL 10x      GJL 2x10     Manual C/s retraction + ext  GJL 10x GJL 20x GJL 20x    GJL 10x  GJL 10x                Assessment  GJL   GJL   GJL GJL    Neuro Re-Ed             Supine chin tuck with serratus reach 10x5\" HEP Held           Row  Plum Tube 2x15 Plum Tube 30x5\"  Plum Tube 30x5\"  " "Plum TB 30x5\" HEP HEP       LPD  Peach Tube 2x15 Orange Tube 30x5\"  Orange Tube 30x5\"  Peach TB 30x5\" HEP HEP       Band W   Peach Tube 2x8 Peach Tube 2x10         Prone T  Stand no resist 2x10           Horizontal abduction             Wall Slide LT Lift             Eccentric wrist extension 2# 2x10 HEP HEP   Rev HEP        Isometrics ER/  Abd NV?            ER  [place] Tube 3x10 Green Tube 3x10 Green Tube 3x10 Orange TB 3x10 HEP HEP       Body Blade   AP/ ML at side 3x20\"  AP/ ML at side 3x20\"    AP/ ML at side 3x20\"       Radial N. Glide    3x10 HEP Rev HEP   2x10 HEP 3x10 Rev   Band B/L ER loop      2x10x5\" HEP PTB 2x10x5\"      Band B/L ER scoop      2x5 HEP PTB 2x10      Band B/L ER flexion      2x5 HEP PTB 2x10      SL ER       1# 3x10  2# 7x, 1# 3x10    ER 45 deg flexion       1# 3x8      Prone T        10x5\" 4x5 4x5   Prone I         3x10     Median N. Glide        2x10 HEP  2x10   Supine 90/90 ER eccentric          2x10 GTB HEP                                          Ther Ex             UBE  2'/2'    2'/2' 2'/2' 2'/2'     Education           GJL HEP   Thoracic extension chair  NV           Prone t/spine ext + chin tuck        10x5\" HEP 5x15\"  3x30\"    Thoracic ext supine foam roll  NV 3' 3'         Self STM Posterior RTC/subscap 2' ball on wall HEP  Rev HEP Rev HEP         Seated c/s retraction + extension  3x10 HEP 2x10 Rev HEP Rev HEP    2x10 HEP  3x10    Flexion AROM      1# to 90 3x10 HEP HEP       Half kneel KB 90/90       5# KB 5x       Sleeper stretch         5x5\" gentle    Cross body stretch         5x5\"                              Ther Activity                                       Gait Training                                       Modalities                                            "

## 2025-01-02 ENCOUNTER — OFFICE VISIT (OUTPATIENT)
Dept: GASTROENTEROLOGY | Facility: MEDICAL CENTER | Age: 50
End: 2025-01-02
Payer: COMMERCIAL

## 2025-01-02 VITALS
TEMPERATURE: 97.8 F | SYSTOLIC BLOOD PRESSURE: 122 MMHG | OXYGEN SATURATION: 98 % | BODY MASS INDEX: 26.82 KG/M2 | DIASTOLIC BLOOD PRESSURE: 76 MMHG | HEART RATE: 71 BPM | WEIGHT: 161 LBS | HEIGHT: 65 IN

## 2025-01-02 DIAGNOSIS — K90.41 NON-CELIAC GLUTEN SENSITIVITY: Primary | ICD-10-CM

## 2025-01-02 DIAGNOSIS — K58.9 IRRITABLE BOWEL SYNDROME, UNSPECIFIED TYPE: ICD-10-CM

## 2025-01-02 PROCEDURE — 99214 OFFICE O/P EST MOD 30 MIN: CPT | Performed by: NURSE PRACTITIONER

## 2025-01-02 NOTE — PROGRESS NOTES
Name: Sharon Patel      : 1975      MRN: [id number]  Encounter Provider: JOSE Sosa  Encounter Date: 2025   [facility]  :  Assessment & Plan  Non-celiac gluten sensitivity  Recent elevated gliadin antibody, TTG G IgA was normal.  Underwent EGD which noted normal small bowel biopsies.  She feels worse when she eats gluten so she has eliminated it from her diet.  If she does eat some gluten she does get symptoms of abdominal bloating, discomfort.  No weight loss.  History of IBS also.    -Continue gluten-free diet  -Continue probiotic and fiber supplement daily  -Follow-up in office in 1 year or sooner if needed         Irritable bowel syndrome, unspecified type  History of IBS.  BMs are brown and formed daily.  Occasionally has loose stools but usually related to something she has eaten.  Last colonoscopy was  which noted 2 polyps.  Path report not available during visit today.  Patient reports her next colonoscopy was recommended in 10 years.  No melena or hematochezia.  She is on a fiber supplement and probiotic daily.  If symptoms worsen would consider low FODMAP diet           History of Present Illness   HPI  Sharon Patel is a 49 y.o. female who presents for follow-up.  Last seen by Dr. Hoyt at  for history of dyspepsia and screening colonoscopy.    History of positive gliadin antibodies but normal TTG IgA.  Underwent an EGD  which was normal.  Small bowel biopsies were negative for celiac disease.    Interval history: Reports she does get symptoms when she eats gluten.  She does get some bloating and abdominal discomfort.  She has eliminated this from her diet and is feeling better overall.  She does have a history of IBS.  She is on a fiber supplement daily as well as a probiotic.        No recent abdominal imaging to review    Labs -CMP normal other than total protein 6.1, CBC normal,    Prior  "EGD/colonoscopy    EGD  (Dr. Villavicencio)-normal exam.  Small bowel biopsy was negative for celiac disease.    Colonoscopy -2 polyps.       History obtained from: patient    Review of Systems   Gastrointestinal:  Positive for abdominal distention, abdominal pain and diarrhea.   All other systems reviewed and are negative.    Medical History Reviewed by provider this encounter:  Tobacco  Allergies  Meds  Problems  Med Hx  Surg Hx  Fam Hx     .  Current Outpatient Medications on File Prior to Visit   Medication Sig Dispense Refill    albuterol (PROVENTIL HFA,VENTOLIN HFA) 90 mcg/act inhaler Inhale 2 puffs every 6 (six) hours as needed for wheezing      cetirizine (ZyrTEC) 10 mg tablet       cholecalciferol (VITAMIN D3) 1,000 units tablet Take 1,000 Units by mouth daily      ibuprofen (MOTRIN) 200 mg tablet Take 200-800 mg by mouth every 6 (six) hours as needed for mild pain      montelukast (SINGULAIR) 10 mg tablet TAKE 1 TABLET (10 MG) BY MOUTH DAILY.      Omega 3-6-9 Fatty Acids (OMEGA 3-6-9 COMPLEX PO) Take by mouth in the morning      vitamin B-12 (VITAMIN B-12) 1,000 mcg tablet Take 1,000 mcg by mouth      Zinc 22.5 MG TABS Take by mouth       No current facility-administered medications on file prior to visit.      Social History     Tobacco Use    Smoking status: Former     Current packs/day: 0.00     Average packs/day: 1 pack/day for 7.0 years (7.0 ttl pk-yrs)     Types: Cigarettes     Start date:      Quit date: 2000     Years since quittin.0    Smokeless tobacco: Former     Quit date:    Vaping Use    Vaping status: Never Used   Substance and Sexual Activity    Alcohol use: Yes     Comment: wine/ beer    Drug use: No    Sexual activity: Not on file        Objective   /76 (BP Location: Left arm)   Pulse 71   Temp 97.8 °F (36.6 °C)   Ht 5' 5\" (1.651 m)   Wt 73 kg (161 lb)   SpO2 98%   BMI 26.79 kg/m²      Physical Exam  Constitutional:       Appearance: Normal appearance. "   HENT:      Head: Normocephalic.   Abdominal:      General: Bowel sounds are normal.      Palpations: Abdomen is soft.      Tenderness: There is no abdominal tenderness. There is no guarding.   Skin:     General: Skin is warm and dry.   Neurological:      Mental Status: She is alert and oriented to person, place, and time.

## 2025-01-10 ENCOUNTER — OFFICE VISIT (OUTPATIENT)
Dept: URGENT CARE | Age: 50
End: 2025-01-10
Payer: COMMERCIAL

## 2025-01-10 VITALS
TEMPERATURE: 97.1 F | BODY MASS INDEX: 27.66 KG/M2 | HEART RATE: 75 BPM | SYSTOLIC BLOOD PRESSURE: 106 MMHG | OXYGEN SATURATION: 98 % | HEIGHT: 64 IN | RESPIRATION RATE: 18 BRPM | DIASTOLIC BLOOD PRESSURE: 64 MMHG | WEIGHT: 162 LBS

## 2025-01-10 DIAGNOSIS — J02.9 SORE THROAT: ICD-10-CM

## 2025-01-10 DIAGNOSIS — J02.9 ACUTE PHARYNGITIS, UNSPECIFIED ETIOLOGY: Primary | ICD-10-CM

## 2025-01-10 LAB — S PYO AG THROAT QL: NEGATIVE

## 2025-01-10 PROCEDURE — G0383 LEV 4 HOSP TYPE B ED VISIT: HCPCS | Performed by: EMERGENCY MEDICINE

## 2025-01-10 PROCEDURE — 87070 CULTURE OTHR SPECIMN AEROBIC: CPT | Performed by: EMERGENCY MEDICINE

## 2025-01-10 PROCEDURE — S9083 URGENT CARE CENTER GLOBAL: HCPCS | Performed by: EMERGENCY MEDICINE

## 2025-01-10 PROCEDURE — 87880 STREP A ASSAY W/OPTIC: CPT | Performed by: EMERGENCY MEDICINE

## 2025-01-10 NOTE — LETTER
January 10, 2025     Patient: Sharon Patel   YOB: 1975   Date of Visit: 1/10/2025       To Whom it May Concern:    Sharon Patel was seen in my clinic on 1/10/2025. She may return to work on 1/13/25 .    If you have any questions or concerns, please don't hesitate to call.         Sincerely,          Gasper Dowell,         CC: No Recipients

## 2025-01-10 NOTE — PROGRESS NOTES
[facility]        NAME: Sharon Patel is a 49 y.o. female  : 1975    MRN: [id number]  DATE: January 10, 2025  TIME: 11:08 AM    Assessment and Plan   No primary diagnosis found.  No diagnosis found.    POCT rapid strep:    Patient Instructions       Follow up with PCP in 3-5 days.  Proceed to  ER if symptoms worsen.    If tests are performed, our office will contact you with results only if changes need to made to the care plan discussed with you at the visit. [facility]    Chief Complaint     Chief Complaint   Patient presents with    Sore Throat    Cold Like Symptoms     C/o symptoms starting yesterday, son recently had strep.          History of Present Illness       Patient is a 50 yo female with no significant PMH presenting in the clinic today for cold sx which began yesterday. Admits cough, congestion, sore throat, hoarse voice  Denies  Admits the use of _ for sx management. Positive recent sick contacts as patient recently exposed to strep pharyngitis.    Sore Throat   This is a new problem. The current episode started yesterday. The problem has been rapidly worsening. Neither side of throat is experiencing more pain than the other. There has been no fever. The pain is at a severity of 8/10. The pain is severe. Associated symptoms include congestion, coughing, a hoarse voice, a plugged ear sensation, neck pain, swollen glands and trouble swallowing. Pertinent negatives include no abdominal pain, diarrhea, drooling, ear discharge, ear pain, headaches, shortness of breath, stridor or vomiting. She has had exposure to strep.       Review of Systems   Review of Systems   Constitutional:  Negative for chills, fatigue and fever.   HENT:  Positive for congestion, hoarse voice, sore throat and trouble swallowing. Negative for drooling, ear discharge, ear pain, postnasal drip, rhinorrhea, sinus pressure and sinus pain.    Respiratory:  Positive for cough.  Negative for shortness of breath and stridor.    Cardiovascular:  Negative for chest pain.   Gastrointestinal:  Negative for abdominal pain, diarrhea, nausea and vomiting.   Musculoskeletal:  Positive for neck pain. Negative for myalgias.   Skin:  Negative for rash.   Neurological:  Negative for headaches.         Current Medications       Current Outpatient Medications:     albuterol (PROVENTIL HFA,VENTOLIN HFA) 90 mcg/act inhaler, Inhale 2 puffs every 6 (six) hours as needed for wheezing, Disp: , Rfl:     cetirizine (ZyrTEC) 10 mg tablet, , Disp: , Rfl:     cholecalciferol (VITAMIN D3) 1,000 units tablet, Take 1,000 Units by mouth daily, Disp: , Rfl:     ibuprofen (MOTRIN) 200 mg tablet, Take 200-800 mg by mouth every 6 (six) hours as needed for mild pain, Disp: , Rfl:     montelukast (SINGULAIR) 10 mg tablet, TAKE 1 TABLET (10 MG) BY MOUTH DAILY., Disp: , Rfl:     Omega 3-6-9 Fatty Acids (OMEGA 3-6-9 COMPLEX PO), Take by mouth in the morning, Disp: , Rfl:     vitamin B-12 (VITAMIN B-12) 1,000 mcg tablet, Take 1,000 mcg by mouth, Disp: , Rfl:     Zinc 22.5 MG TABS, Take by mouth, Disp: , Rfl:     Current Allergies     Allergies as of 01/10/2025 - Reviewed 01/10/2025   Allergen Reaction Noted    Other Wheezing 09/11/2023            The following portions of the patient's history were reviewed and updated as appropriate: allergies, current medications, past family history, past medical history, past social history, past surgical history and problem list.     Past Medical History:   Diagnosis Date    Allergic     Anesthesia complication     believes awoke during procedure    Excessive and frequent menstruation     Exercise-induced asthma     Polyp of corpus uteri     Post concussion syndrome     doing vision therapy    Seasonal allergies        Past Surgical History:   Procedure Laterality Date    DILATION AND CURETTAGE OF UTERUS      POLYPECTOMY N/A 11/7/2019    Procedure: POLYPECTOMY;  Surgeon: Lilliam Coyle,  DO;  Location: [facility];  Service: Gynecology    CT HYSTEROSCOPY BX ENDOMETRIUM&/POLYPC W/WO D&C N/A 11/7/2019    Procedure: D&C, HYSTEROSCOPY W/ RESECTION;  Surgeon: Lilliam Coyle DO;  Location: [facility];  Service: Gynecology    TREATMENT FISTULA ANAL      WISDOM TOOTH EXTRACTION         Family History   Problem Relation Age of Onset    No Known Problems Mother     Lymphoma Father 65    Brain cancer Maternal Grandmother 50    No Known Problems Maternal Grandfather     Breast cancer Paternal Grandmother 92    Melanoma Paternal Grandfather 86    No Known Problems Brother     No Known Problems Son     No Known Problems Son     No Known Problems Maternal Aunt     No Known Problems Maternal Aunt     Brain cancer Paternal Aunt 70    Breast cancer Paternal Aunt 61         Medications have been verified.        Objective   There were no vitals taken for this visit.       Physical Exam     Physical Exam  Vitals reviewed.   Constitutional:       General: She is not in acute distress.     Appearance: Normal appearance. She is normal weight. She is not ill-appearing.   HENT:      Head: Normocephalic.      Right Ear: Hearing, tympanic membrane, ear canal and external ear normal. No middle ear effusion. There is no impacted cerumen. Tympanic membrane is not erythematous or bulging.      Left Ear: Hearing, tympanic membrane, ear canal and external ear normal. No tenderness. There is no impacted cerumen. Tympanic membrane is not erythematous or bulging.      Nose: Nose normal. No congestion or rhinorrhea.      Right Sinus: No maxillary sinus tenderness or frontal sinus tenderness.      Left Sinus: No maxillary sinus tenderness or frontal sinus tenderness.      Mouth/Throat:      Lips: Pink.      Mouth: Mucous membranes are moist.      Pharynx: Oropharynx is clear. Uvula midline. No pharyngeal swelling, oropharyngeal exudate, posterior oropharyngeal erythema or uvula swelling.      Tonsils: No tonsillar exudate or  tonsillar abscesses. 1+ on the right. 1+ on the left.   Eyes:      General:         Right eye: No discharge.         Left eye: No discharge.      Conjunctiva/sclera: Conjunctivae normal.   Cardiovascular:      Rate and Rhythm: Normal rate and regular rhythm.      Pulses: Normal pulses.      Heart sounds: Normal heart sounds. No murmur heard.     No friction rub. No gallop.   Pulmonary:      Effort: Pulmonary effort is normal.      Breath sounds: Normal breath sounds. No wheezing, rhonchi or rales.   Musculoskeletal:      Cervical back: Normal range of motion and neck supple. No tenderness.   Lymphadenopathy:      Cervical: No cervical adenopathy.   Skin:     General: Skin is warm.      Findings: No rash.   Neurological:      Mental Status: She is alert.   Psychiatric:         Mood and Affect: Mood normal.         Behavior: Behavior normal.

## 2025-01-12 LAB — BACTERIA THROAT CULT: NORMAL

## 2025-04-22 ENCOUNTER — OFFICE VISIT (OUTPATIENT)
Age: 50
End: 2025-04-22
Payer: COMMERCIAL

## 2025-04-22 VITALS
SYSTOLIC BLOOD PRESSURE: 110 MMHG | WEIGHT: 159 LBS | DIASTOLIC BLOOD PRESSURE: 66 MMHG | BODY MASS INDEX: 26.49 KG/M2 | HEIGHT: 65 IN | OXYGEN SATURATION: 98 % | TEMPERATURE: 98.9 F | HEART RATE: 75 BPM

## 2025-04-22 DIAGNOSIS — J45.990 EXERCISE-INDUCED ASTHMA: ICD-10-CM

## 2025-04-22 DIAGNOSIS — R05.1 ACUTE COUGH: Primary | ICD-10-CM

## 2025-04-22 DIAGNOSIS — Z12.31 ENCOUNTER FOR SCREENING MAMMOGRAM FOR BREAST CANCER: ICD-10-CM

## 2025-04-22 LAB
SARS-COV-2 AG UPPER RESP QL IA: NEGATIVE
SL AMB POCT RAPID FLU A: NEGATIVE
SL AMB POCT RAPID FLU B: NEGATIVE
VALID CONTROL: NORMAL

## 2025-04-22 PROCEDURE — 87811 SARS-COV-2 COVID19 W/OPTIC: CPT | Performed by: INTERNAL MEDICINE

## 2025-04-22 PROCEDURE — 87804 INFLUENZA ASSAY W/OPTIC: CPT | Performed by: INTERNAL MEDICINE

## 2025-04-22 PROCEDURE — 99213 OFFICE O/P EST LOW 20 MIN: CPT | Performed by: INTERNAL MEDICINE

## 2025-04-22 RX ORDER — MULTIVIT-MIN/IRON/FOLIC ACID/K 18-600-40
CAPSULE ORAL DAILY
COMMUNITY

## 2025-04-22 RX ORDER — METHYLPREDNISOLONE 4 MG/1
TABLET ORAL
Qty: 21 EACH | Refills: 0 | Status: SHIPPED | OUTPATIENT
Start: 2025-04-22 | End: 2025-04-22

## 2025-04-22 RX ORDER — METHYLPREDNISOLONE 4 MG/1
TABLET ORAL
Qty: 21 EACH | Refills: 0 | Status: SHIPPED | OUTPATIENT
Start: 2025-04-22 | End: 2025-04-22 | Stop reason: SDUPTHER

## 2025-04-22 RX ORDER — AZITHROMYCIN 250 MG/1
TABLET, FILM COATED ORAL
Qty: 6 TABLET | Refills: 0 | Status: SHIPPED | OUTPATIENT
Start: 2025-04-22 | End: 2025-04-27

## 2025-04-22 RX ORDER — AZITHROMYCIN 250 MG/1
TABLET, FILM COATED ORAL
Qty: 6 TABLET | Refills: 0 | Status: SHIPPED | OUTPATIENT
Start: 2025-04-22 | End: 2025-04-22

## 2025-04-22 RX ORDER — METHYLPREDNISOLONE 4 MG/1
TABLET ORAL
Qty: 21 EACH | Refills: 0 | Status: SHIPPED | OUTPATIENT
Start: 2025-04-22

## 2025-04-22 RX ORDER — EPINEPHRINE 0.3 MG/.3ML
0.3 INJECTION SUBCUTANEOUS
COMMUNITY
Start: 2025-03-10

## 2025-04-22 NOTE — PROGRESS NOTES
Name: Sharon Patel      : 1975      MRN: [id number]  Encounter Provider: Keyla Mattson MD  Encounter Date: 2025   Encounter department: [facility]    :  Assessment & Plan  Encounter for screening mammogram for breast cancer    Orders:    Mammo screening bilateral w 3d and cad; Future    Acute cough    Orders:    POCT Rapid Covid Ag    POCT rapid flu A and B    azithromycin (Zithromax) 250 mg tablet; Take 2 tablets (500 mg total) by mouth daily for 1 day, THEN 1 tablet (250 mg total) daily for 4 days.    methylPREDNISolone 4 MG tablet therapy pack; Use as directed on package  Both the POCT test were negative  Exercise-induced asthma             Assessment & Plan  1. Bronchitis.  - Symptoms suggest a potential progression towards bronchitis, with postnasal drip causing throat irritation and chest congestion.  - Examination revealed significant drainage in the back of the throat and heavy lungs, with no swelling of the tonsils.  - Discussed the patient's history of requiring steroids for respiratory issues post-COVID and the presence of a student with pneumonia in her class.  - A Medrol Dosepak (21 tablets) will be prescribed, to be taken as 5 tablets on the first day, then tapering down by one tablet each day. A Z-Matt (azithromycin) will also be prescribed, to be taken only if necessary. She is advised to take a probiotic if she starts the antibiotic. Robitussin DM is recommended for cough relief. She should continue using her inhaler and consider using a nebulizer if available. She is advised to wear a mask for at least 24 hours to prevent spreading the infection.     Chief Complaint   Patient presents with    Cold Like Symptoms     Patient c/o symptoms starting 25 with chest tightness and congestion.  She is taking Mucinex, Flonase and Albuterol.  She is also using Neva pot. Today, she has a productive cough and post nasal drip.  She had chills and  "sweating today and a sore throat.     Health Maintenance     She will schedule a mammogram in June.      History of Present Illness     History of Present Illness  The patient presents for evaluation of chest congestion, cough, and sore throat.    Seasonal allergies are typically experienced, but symptoms were under control until the onset of the current issue. A sudden onset of chest heaviness occurred on Sunday afternoon, reminiscent of bronchitis episodes. Accompanying symptoms include a sore throat, persistent cough, and postnasal drip, with no significant nasal discharge. Difficulty expectorating lung secretions was noted, but an increase in productive cough occurred this morning, yielding yellowish, milky sputum. Morning facial puffiness, crusty eyes, and ear discomfort are reported. Sleep was disrupted last night due to severe coughing. Current self-medication includes an inhaler, Mucinex, Flonase, and eucalyptus steam, but without significant relief. Use of a humidifier at night, magnesium, and vitamin C is also reported. Tea and other liquids are being consumed. A nebulizer is not currently used. Oral contraceptives are not taken. Singulair is currently used, and steroids have been required for respiratory infections post-COVID-19. No menstrual irregularities associated with steroid use have been observed. A history of asthma exacerbations during respiratory illnesses is noted.     Review of Systems   HENT:  Positive for congestion, postnasal drip and sinus pressure.    Respiratory:  Positive for chest tightness and wheezing.    All other systems reviewed and are negative.    Objective   /66 (BP Location: Left arm, Patient Position: Sitting, Cuff Size: Standard)   Pulse 75   Temp 98.9 °F (37.2 °C) (Temporal)   Ht 5' 5\" (1.651 m)   Wt 72.1 kg (159 lb)   SpO2 98%   BMI 26.46 kg/m²     Physical Exam  Mouth/Throat: Significant drainage in the back of the throat causing irritation. Tonsils not " swollen.  Respiratory: Clear to auscultation, no wheezing, rales or rhonchi  Physical Exam    Gen: NAD  Heent: atraumatic, normocephalic  Mouth: is dry, posterior pharyngeal erythema  Neck: is supple, no JVD, no carotid bruits.   Heart: is regular Normal s1, s2,  Lungs: Scattered expiratory rhonchi, no shortness of breath at rest  Ext: no edema, distal pulses normal  Skin: no rashes, ulcers  Mood: normal affect  Neuro: AAOx3

## 2025-04-29 ENCOUNTER — HOSPITAL ENCOUNTER (EMERGENCY)
Facility: HOSPITAL | Age: 50
Discharge: HOME | End: 2025-04-30
Attending: EMERGENCY MEDICINE
Payer: COMMERCIAL

## 2025-04-29 ENCOUNTER — APPOINTMENT (EMERGENCY)
Dept: CT IMAGING | Facility: HOSPITAL | Age: 50
End: 2025-04-29
Payer: COMMERCIAL

## 2025-04-29 DIAGNOSIS — R51.9 HEADACHE: Primary | ICD-10-CM

## 2025-04-29 DIAGNOSIS — R79.89 ELEVATED TSH: ICD-10-CM

## 2025-04-29 DIAGNOSIS — R19.7 DIARRHEA: ICD-10-CM

## 2025-04-29 LAB
ALBUMIN SERPL BCG-MCNC: 4 G/DL (ref 3.5–5)
ALP SERPL-CCNC: 60 U/L (ref 34–104)
ALT SERPL W P-5'-P-CCNC: 11 U/L (ref 7–52)
ANION GAP SERPL CALCULATED.3IONS-SCNC: 5 MMOL/L (ref 4–13)
APTT PPP: 25 SECONDS (ref 23–34)
AST SERPL W P-5'-P-CCNC: 10 U/L (ref 13–39)
BASOPHILS # BLD AUTO: 0.07 THOUSANDS/ÂΜL (ref 0–0.1)
BASOPHILS NFR BLD AUTO: 1 % (ref 0–1)
BILIRUB SERPL-MCNC: 0.24 MG/DL (ref 0.2–1)
BUN SERPL-MCNC: 17 MG/DL (ref 5–25)
CALCIUM SERPL-MCNC: 8.9 MG/DL (ref 8.4–10.2)
CARDIAC TROPONIN I PNL SERPL HS: 5 NG/L (ref ?–50)
CHLORIDE SERPL-SCNC: 105 MMOL/L (ref 96–108)
CO2 SERPL-SCNC: 27 MMOL/L (ref 21–32)
CREAT SERPL-MCNC: 0.61 MG/DL (ref 0.6–1.3)
EOSINOPHIL # BLD AUTO: 0.13 THOUSAND/ÂΜL (ref 0–0.61)
EOSINOPHIL NFR BLD AUTO: 1 % (ref 0–6)
ERYTHROCYTE [DISTWIDTH] IN BLOOD BY AUTOMATED COUNT: 12.7 % (ref 11.6–15.1)
GFR SERPL CREATININE-BSD FRML MDRD: 108 ML/MIN/1.73SQ M
GLUCOSE SERPL-MCNC: 110 MG/DL (ref 65–140)
HCT VFR BLD AUTO: 36 % (ref 34.8–46.1)
HGB BLD-MCNC: 12.4 G/DL (ref 11.5–15.4)
IMM GRANULOCYTES # BLD AUTO: 0.05 THOUSAND/UL (ref 0–0.2)
IMM GRANULOCYTES NFR BLD AUTO: 0 % (ref 0–2)
INR PPP: 0.96 (ref 0.85–1.19)
LYMPHOCYTES # BLD AUTO: 2.28 THOUSANDS/ÂΜL (ref 0.6–4.47)
LYMPHOCYTES NFR BLD AUTO: 19 % (ref 14–44)
MCH RBC QN AUTO: 31.7 PG (ref 26.8–34.3)
MCHC RBC AUTO-ENTMCNC: 34.4 G/DL (ref 31.4–37.4)
MCV RBC AUTO: 92 FL (ref 82–98)
MONOCYTES # BLD AUTO: 0.78 THOUSAND/ÂΜL (ref 0.17–1.22)
MONOCYTES NFR BLD AUTO: 7 % (ref 4–12)
NEUTROPHILS # BLD AUTO: 8.62 THOUSANDS/ÂΜL (ref 1.85–7.62)
NEUTS SEG NFR BLD AUTO: 72 % (ref 43–75)
NRBC BLD AUTO-RTO: 0 /100 WBCS
PLATELET # BLD AUTO: 342 THOUSANDS/UL (ref 149–390)
PMV BLD AUTO: 9.1 FL (ref 8.9–12.7)
POTASSIUM SERPL-SCNC: 3.8 MMOL/L (ref 3.5–5.3)
PROT SERPL-MCNC: 5.9 G/DL (ref 6.4–8.4)
PROTHROMBIN TIME: 13 SECONDS (ref 12.3–15)
RBC # BLD AUTO: 3.91 MILLION/UL (ref 3.81–5.12)
SODIUM SERPL-SCNC: 137 MMOL/L (ref 135–147)
TSH SERPL DL<=0.05 MIU/L-ACNC: 4.74 UIU/ML (ref 0.45–4.5)
WBC # BLD AUTO: 11.93 THOUSAND/UL (ref 4.31–10.16)

## 2025-04-29 PROCEDURE — 84439 ASSAY OF FREE THYROXINE: CPT

## 2025-04-29 PROCEDURE — 99284 EMERGENCY DEPT VISIT MOD MDM: CPT

## 2025-04-29 PROCEDURE — 70498 CT ANGIOGRAPHY NECK: CPT

## 2025-04-29 PROCEDURE — 84443 ASSAY THYROID STIM HORMONE: CPT

## 2025-04-29 PROCEDURE — 80053 COMPREHEN METABOLIC PANEL: CPT

## 2025-04-29 PROCEDURE — 85025 COMPLETE CBC W/AUTO DIFF WBC: CPT

## 2025-04-29 PROCEDURE — 85730 THROMBOPLASTIN TIME PARTIAL: CPT

## 2025-04-29 PROCEDURE — 93005 ELECTROCARDIOGRAM TRACING: CPT

## 2025-04-29 PROCEDURE — 85610 PROTHROMBIN TIME: CPT

## 2025-04-29 PROCEDURE — 84484 ASSAY OF TROPONIN QUANT: CPT

## 2025-04-29 PROCEDURE — 70496 CT ANGIOGRAPHY HEAD: CPT

## 2025-04-29 PROCEDURE — 96365 THER/PROPH/DIAG IV INF INIT: CPT

## 2025-04-29 PROCEDURE — 36415 COLL VENOUS BLD VENIPUNCTURE: CPT

## 2025-04-29 RX ORDER — ACETAMINOPHEN 10 MG/ML
1000 INJECTION, SOLUTION INTRAVENOUS ONCE
Status: COMPLETED | OUTPATIENT
Start: 2025-04-29 | End: 2025-04-30

## 2025-04-29 RX ADMIN — SODIUM CHLORIDE 1000 ML: 0.9 INJECTION, SOLUTION INTRAVENOUS at 23:50

## 2025-04-29 RX ADMIN — ACETAMINOPHEN 1000 MG: 10 INJECTION INTRAVENOUS at 23:50

## 2025-04-29 RX ADMIN — IOHEXOL 85 ML: 350 INJECTION, SOLUTION INTRAVENOUS at 23:25

## 2025-04-30 VITALS
SYSTOLIC BLOOD PRESSURE: 106 MMHG | RESPIRATION RATE: 16 BRPM | DIASTOLIC BLOOD PRESSURE: 66 MMHG | OXYGEN SATURATION: 94 % | HEART RATE: 66 BPM | TEMPERATURE: 98.2 F

## 2025-04-30 LAB
2HR DELTA HS TROPONIN: 6 NG/L
4HR DELTA HS TROPONIN: 4 NG/L
ATRIAL RATE: 66 BPM
ATRIAL RATE: 66 BPM
ATRIAL RATE: 74 BPM
CARDIAC TROPONIN I PNL SERPL HS: 11 NG/L (ref ?–50)
CARDIAC TROPONIN I PNL SERPL HS: 9 NG/L (ref ?–50)
P AXIS: 59 DEGREES
P AXIS: 61 DEGREES
P AXIS: 67 DEGREES
PR INTERVAL: 166 MS
PR INTERVAL: 170 MS
PR INTERVAL: 174 MS
QRS AXIS: 64 DEGREES
QRS AXIS: 70 DEGREES
QRS AXIS: 72 DEGREES
QRSD INTERVAL: 74 MS
QRSD INTERVAL: 78 MS
QRSD INTERVAL: 82 MS
QT INTERVAL: 394 MS
QT INTERVAL: 412 MS
QT INTERVAL: 428 MS
QTC INTERVAL: 413 MS
QTC INTERVAL: 448 MS
QTC INTERVAL: 457 MS
T WAVE AXIS: 43 DEGREES
T WAVE AXIS: 47 DEGREES
T WAVE AXIS: 54 DEGREES
T4 FREE SERPL-MCNC: 1.11 NG/DL (ref 0.61–1.12)
VENTRICULAR RATE: 66 BPM
VENTRICULAR RATE: 66 BPM
VENTRICULAR RATE: 74 BPM

## 2025-04-30 PROCEDURE — 84484 ASSAY OF TROPONIN QUANT: CPT

## 2025-04-30 PROCEDURE — 93005 ELECTROCARDIOGRAM TRACING: CPT

## 2025-04-30 PROCEDURE — 36415 COLL VENOUS BLD VENIPUNCTURE: CPT

## 2025-04-30 PROCEDURE — 96361 HYDRATE IV INFUSION ADD-ON: CPT

## 2025-04-30 PROCEDURE — 99285 EMERGENCY DEPT VISIT HI MDM: CPT

## 2025-04-30 PROCEDURE — 93010 ELECTROCARDIOGRAM REPORT: CPT | Performed by: INTERNAL MEDICINE

## 2025-04-30 RX ORDER — METHOCARBAMOL 500 MG/1
500 TABLET, FILM COATED ORAL 2 TIMES DAILY
Qty: 20 TABLET | Refills: 0 | Status: SHIPPED | OUTPATIENT
Start: 2025-04-30

## 2025-04-30 RX ORDER — METHOCARBAMOL 500 MG/1
500 TABLET, FILM COATED ORAL ONCE
Status: COMPLETED | OUTPATIENT
Start: 2025-04-30 | End: 2025-04-30

## 2025-04-30 RX ADMIN — METHOCARBAMOL 500 MG: 500 TABLET ORAL at 01:26

## 2025-04-30 NOTE — ED PROVIDER NOTES
Time reflects when diagnosis was documented in both MDM as applicable and the Disposition within this note       Time User Action Codes Description Comment    4/30/2025  4:06 AM Bridget Fernández Add [R51.9] Headache     4/30/2025  4:06 AM Bridget Fernández Add [R79.89] Elevated TSH     4/30/2025  4:07 AM Bridget Fernández Add [R19.7] Diarrhea           ED Disposition       ED Disposition   Discharge    Condition   Stable    Date/Time   Wed Apr 30, 2025  4:06 AM    Comment   Sharon Mimshenrietta discharge to home/self care.                   Assessment & Plan       Medical Decision Making  DDx including but not limited to: tension headache, cluster headache, migraine, ICH, SAH, tumor, dissection, reversable cerebral vasoconstriction syndrome (RCVS), thyroid abnormality, electrolyte abnormality, anemia. Doubt meningitis, temporal arteritis, glaucoma, carbon monoxide poisoning.    Will obtain EKG, troponin to evaluate for ACS.  Will obtain CTA head and neck.  Will obtain CBC to evaluate for leukocytosis, anemia.  Will obtain CMP to evaluate kidney function, for electrolyte disturbance.     Mild leukocytosis. TSH mildly elevated; T4 sent. Initial labs otherwise without significant abnormality. Troponin 5,will obtain 2 hr.     CTA without acute findings. On re-examination, patient reports improvement, but not resolution of headache. Will trial robaxin.     Delta troponin 6. No EKG changes. Pt without CP. Will obtain 4 hr. Pt declines additional headache treatment at this time.    4 hr troponin WINL. Patient remains in no acute distress, no focal neurologic deficits. VSS, hypertension has resolved. Pt reports headache continues to feel improved. Will dc    At the time of discharge, the patient is in no acute distress. I discussed with the patient the diagnosis, treatment plan, follow-up, return precautions, and discharge instructions; they were given the opportunity to ask questions and verbalized understanding.      Problems  Addressed:  Diarrhea: acute illness or injury  Elevated TSH: acute illness or injury  Headache: acute illness or injury    Amount and/or Complexity of Data Reviewed  External Data Reviewed: labs, radiology and notes.  Labs: ordered. Decision-making details documented in ED Course.  Radiology: ordered. Decision-making details documented in ED Course.  ECG/medicine tests: ordered and independent interpretation performed. Decision-making details documented in ED Course.    Risk  Prescription drug management.      EKG: NSR at 66 BPM, normal axis, , QTc 413, no St elevation or depression as interpreted by me    Repeat EKG: NSR at 66 BPM, , QTc 448, normal axis, no ST elevation or depression as interpreted by me     Repeat EKG: NSR at 74 BPM, , QTc 457, no ST elevation or depression as interpreted by me     ED Course as of 04/30/25 0556   Tue Apr 29, 2025 2329 WBC(!): 11.93   Wed Apr 30, 2025   0009 TSH 3RD GENERATON(!): 4.742   0154 Delta 2hr hsTnI: 6   0406 hs TnI 4hr: 9       Medications   sodium chloride 0.9 % bolus 1,000 mL (0 mL Intravenous Stopped 4/30/25 0124)   acetaminophen (Ofirmev) injection 1,000 mg (0 mg Intravenous Stopped 4/30/25 0043)   iohexol (OMNIPAQUE) 350 MG/ML injection (MULTI-DOSE) 85 mL (85 mL Intravenous Given 4/29/25 2325)   methocarbamol (ROBAXIN) tablet 500 mg (500 mg Oral Given 4/30/25 0126)       ED Risk Strat Scores                    No data recorded                            History of Present Illness       Chief Complaint   Patient presents with    Medical Problem     Pt with shaking, headache,diahrrea.  Pt concnered having a reaction to prednisone.  Pt took last dose on Saturday       Past Medical History:   Diagnosis Date    Allergic     Anesthesia complication     believes awoke during procedure    Excessive and frequent menstruation     Exercise-induced asthma     Polyp of corpus uteri     Post concussion syndrome     doing vision therapy    Seasonal allergies        Past Surgical History:   Procedure Laterality Date    DILATION AND CURETTAGE OF UTERUS      POLYPECTOMY N/A 2019    Procedure: POLYPECTOMY;  Surgeon: Lilliam Coyle DO;  Location: [facility];  Service: Gynecology    IA HYSTEROSCOPY BX ENDOMETRIUM&/POLYPC W/WO D&C N/A 2019    Procedure: D&C, HYSTEROSCOPY W/ RESECTION;  Surgeon: Lilliam Coyle DO;  Location: [facility];  Service: Gynecology    TREATMENT FISTULA ANAL      WISDOM TOOTH EXTRACTION        Family History   Problem Relation Age of Onset    No Known Problems Mother     Lymphoma Father 65    Brain cancer Maternal Grandmother 50    No Known Problems Maternal Grandfather     Breast cancer Paternal Grandmother 92    Melanoma Paternal Grandfather 86    No Known Problems Brother     No Known Problems Son     No Known Problems Son     No Known Problems Maternal Aunt     No Known Problems Maternal Aunt     Brain cancer Paternal Aunt 70    Breast cancer Paternal Aunt 61      Social History     Tobacco Use    Smoking status: Former     Current packs/day: 0.00     Average packs/day: 1 pack/day for 7.0 years (7.0 ttl pk-yrs)     Types: Cigarettes     Start date:      Quit date:      Years since quittin.3    Smokeless tobacco: Former     Quit date:    Vaping Use    Vaping status: Never Used   Substance Use Topics    Alcohol use: Yes     Comment: wine/ beer    Drug use: No      E-Cigarette/Vaping    E-Cigarette Use Never User       E-Cigarette/Vaping Substances    Nicotine No     THC No     CBD No     Flavoring No     Other No     Unknown No       I have reviewed and agree with the history as documented.     The patient is a 49-year-old female with history of asthma, migraines who presents to the ED for evaluation following the sudden onset of headache approximately 1.5 hours ago.  She reports throughout the day, she was having episodes of diarrhea.  She does have intermittent diarrhea at baseline.  While having a bowel movement,  she reports having a sudden onset of a severe headache to the base of her head/top of her neck.  She reports it felt as though something had a strong  on the front of her head, and the back of her head.  This is the worst headache she has ever had.  She reports she had migraines somewhat similar in the past, but they were never this severe, and she has not had a migraine for >10 years. She did have associated photophobia, and nausea but no vomiting. She applied ice to her forehead with some improvement. She did have paresthesias to the front of her forehead, on both sides, though had applied ice prior and is unsure if this was related. She also reports having had tremors with these symptoms. Of note, she was put on Azithromycin and a Medrol dosepak on 4/22 for a cough.   She otherwise denies fever, diplopia, focal weakness, slurred speech, chest pain, dyspnea, abdominal pain, back pain. She does report that since arriving to the ED, her headache has improved. She has not taken anything for symptoms. She does note having a similar headache that was short lived and not as severe 3 days ago.        Review of Systems   Constitutional:  Negative for fatigue and fever.   HENT:  Negative for facial swelling and rhinorrhea.    Respiratory:  Positive for cough (improving). Negative for shortness of breath.    Cardiovascular:  Negative for chest pain and leg swelling.   Gastrointestinal:  Positive for diarrhea and nausea. Negative for abdominal pain, blood in stool, constipation and vomiting.   Genitourinary:  Negative for flank pain and hematuria.   Musculoskeletal:  Negative for arthralgias and myalgias.   Skin:  Negative for rash and wound.   Neurological:  Positive for tremors, numbness and headaches. Negative for syncope, speech difficulty and weakness.           Objective       ED Triage Vitals   Temperature Pulse Blood Pressure Respirations SpO2 Patient Position - Orthostatic VS   04/29/25 2221 04/29/25 2221  04/29/25 2221 04/29/25 2221 04/29/25 2221 04/29/25 2221   98.2 °F (36.8 °C) 84 (!) 190/88 18 100 % Lying      Temp src Heart Rate Source BP Location FiO2 (%) Pain Score    -- 04/29/25 2221 04/30/25 0200 -- 04/29/25 2223     Monitor Left arm  (S) 10 - Worst Possible Pain      Vitals      Date and Time Temp Pulse SpO2 Resp BP Pain Score FACES Pain Rating User   04/30/25 0245 -- 66 94 % 16 106/66 -- --    04/30/25 0200 -- 70 95 % 14 114/60 -- --    04/30/25 0044 -- -- -- -- -- 4 --    04/29/25 2340 -- -- -- -- -- 10 - Worst Possible Pain --    04/29/25 2233 -- 71 98 % 15 142/78 -- --    04/29/25 2223 -- -- -- -- --  10 - Worst Possible Pain --    04/29/25 2221 98.2 °F (36.8 °C) 84 100 % 18 190/88 -- --             Physical Exam  Vitals and nursing note reviewed.   Constitutional:       General: She is not in acute distress.     Appearance: She is well-developed.   HENT:      Head: Normocephalic and atraumatic.   Eyes:      Conjunctiva/sclera: Conjunctivae normal.   Cardiovascular:      Rate and Rhythm: Normal rate and regular rhythm.      Heart sounds: No murmur heard.  Pulmonary:      Effort: Pulmonary effort is normal. No respiratory distress.      Breath sounds: Normal breath sounds.   Abdominal:      Palpations: Abdomen is soft.      Tenderness: There is no abdominal tenderness. There is no right CVA tenderness, left CVA tenderness, guarding or rebound.   Musculoskeletal:         General: No swelling.      Cervical back: Neck supple.   Skin:     General: Skin is warm and dry.      Capillary Refill: Capillary refill takes less than 2 seconds.   Neurological:      General: No focal deficit present.      Mental Status: She is alert and oriented to person, place, and time.      GCS: GCS eye subscore is 4. GCS verbal subscore is 5. GCS motor subscore is 6.      Cranial Nerves: Cranial nerves 2-12 are intact. No facial asymmetry.      Sensory: Sensation is intact.      Motor: Motor function is intact. No  pronator drift.      Coordination: Coordination is intact. Finger-Nose-Finger Test normal.      Gait: Gait is intact.   Psychiatric:         Mood and Affect: Mood normal.         Results Reviewed       Procedure Component Value Units Date/Time    HS Troponin I 4hr [[id number]]  (Normal) Collected: 04/30/25 0322    Lab Status: Final result Specimen: Blood from Arm, Left Updated: 04/30/25 0349     hs TnI 4hr 9 ng/L      Delta 4hr hsTnI 4 ng/L     HS Troponin I 2hr [[id number]]  (Normal) Collected: 04/30/25 0122    Lab Status: Final result Specimen: Blood from Arm, Left Updated: 04/30/25 0152     hs TnI 2hr 11 ng/L      Delta 2hr hsTnI 6 ng/L     TSH, 3rd generation with Free T4 reflex [[id number]]  (Abnormal) Collected: 04/29/25 2316    Lab Status: Final result Specimen: Blood from Arm, Right Updated: 04/29/25 2354     TSH 3RD GENERATON 4.742 uIU/mL     T4, free [[id number]] Collected: 04/29/25 2316    Lab Status: In process Specimen: Blood from Arm, Right Updated: 04/29/25 2354    HS Troponin 0hr (reflex protocol) [[id number]]  (Normal) Collected: 04/29/25 2316    Lab Status: Final result Specimen: Blood from Arm, Right Updated: 04/29/25 2345     hs TnI 0hr 5 ng/L     Comprehensive metabolic panel [[id number]]  (Abnormal) Collected: 04/29/25 2316    Lab Status: Final result Specimen: Blood from Arm, Right Updated: 04/29/25 2339     Sodium 137 mmol/L      Potassium 3.8 mmol/L      Chloride 105 mmol/L      CO2 27 mmol/L      ANION GAP 5 mmol/L      BUN 17 mg/dL      Creatinine 0.61 mg/dL      Glucose 110 mg/dL      Calcium 8.9 mg/dL      AST 10 U/L      ALT 11 U/L      Alkaline Phosphatase 60 U/L      Total Protein 5.9 g/dL      Albumin 4.0 g/dL      Total Bilirubin 0.24 mg/dL      eGFR 108 ml/min/1.73sq m     Narrative:      National Kidney Disease Foundation guidelines for Chronic Kidney Disease (CKD):     Stage 1 with normal or high GFR (GFR > 90 mL/min/1.73 square meters)    Stage 2 Mild CKD (GFR = 60-89 mL/min/1.73  square meters)    Stage 3A Moderate CKD (GFR = 45-59 mL/min/1.73 square meters)    Stage 3B Moderate CKD (GFR = 30-44 mL/min/1.73 square meters)    Stage 4 Severe CKD (GFR = 15-29 mL/min/1.73 square meters)    Stage 5 End Stage CKD (GFR <15 mL/min/1.73 square meters)  Note: GFR calculation is accurate only with a steady state creatinine    Protime-INR [[id number]]  (Normal) Collected: 04/29/25 2316    Lab Status: Final result Specimen: Blood from Arm, Right Updated: 04/29/25 2336     Protime 13.0 seconds      INR 0.96    Narrative:      INR Therapeutic Range    Indication                                             INR Range      Atrial Fibrillation                                               2.0-3.0  Hypercoagulable State                                    2.0.2.3  Left Ventricular Asist Device                            2.0-3.0  Mechanical Heart Valve                                  -    Aortic(with afib, MI, embolism, HF, LA enlargement,    and/or coagulopathy)                                     2.0-3.0 (2.5-3.5)     Mitral                                                             2.5-3.5  Prosthetic/Bioprosthetic Heart Valve               2.0-3.0  Venous thromboembolism (VTE: VT, PE        2.0-3.0    APTT [[id number]]  (Normal) Collected: 04/29/25 2316    Lab Status: Final result Specimen: Blood from Arm, Right Updated: 04/29/25 2336     PTT 25 seconds     CBC and differential [[id number]]  (Abnormal) Collected: 04/29/25 2316    Lab Status: Final result Specimen: Blood from Arm, Right Updated: 04/29/25 2322     WBC 11.93 Thousand/uL      RBC 3.91 Million/uL      Hemoglobin 12.4 g/dL      Hematocrit 36.0 %      MCV 92 fL      MCH 31.7 pg      MCHC 34.4 g/dL      RDW 12.7 %      MPV 9.1 fL      Platelets 342 Thousands/uL      nRBC 0 /100 WBCs      Segmented % 72 %      Immature Grans % 0 %      Lymphocytes % 19 %      Monocytes % 7 %      Eosinophils Relative 1 %      Basophils Relative 1 %      Absolute  Neutrophils 8.62 Thousands/µL      Absolute Immature Grans 0.05 Thousand/uL      Absolute Lymphocytes 2.28 Thousands/µL      Absolute Monocytes 0.78 Thousand/µL      Eosinophils Absolute 0.13 Thousand/µL      Basophils Absolute 0.07 Thousands/µL             CTA head and neck with and without contrast   Final Interpretation by Zack Olguin MD (04/30 0044)         1. No intracranial hemorrhage, mass or mass effect.   2. No intracranial aneurysm.   3. No stenosis, dissection or occlusion of the carotid or vertebral arteries or major vessels of the [place].                  Workstation performed: [id number]             Procedures    ED Medication and Procedure Management   Prior to Admission Medications   Prescriptions Last Dose Informant Patient Reported? Taking?   Ascorbic Acid (Vitamin C) 500 MG CAPS   Yes No   Sig: Take by mouth daily   EPINEPHrine (EPIPEN) 0.3 mg/0.3 mL SOAJ   Yes No   Sig: Inject 0.3 mg into a muscle   MAGNESIUM-OXIDE PO   Yes No   Sig: Take by mouth daily   Omega 3-6-9 Fatty Acids (OMEGA 3-6-9 COMPLEX PO)  Self Yes No   Sig: Take by mouth in the morning   albuterol (PROVENTIL HFA,VENTOLIN HFA) 90 mcg/act inhaler  Self Yes No   Sig: Inhale 2 puffs every 6 (six) hours as needed for wheezing   cetirizine (ZyrTEC) 10 mg tablet   Yes No   Sig: daily   cholecalciferol (VITAMIN D3) 1,000 units tablet  Self Yes No   Sig: Take 1,000 Units by mouth daily   ibuprofen (MOTRIN) 200 mg tablet  Self Yes No   Sig: Take 200-800 mg by mouth every 6 (six) hours as needed for mild pain   methylPREDNISolone 4 MG tablet therapy pack   No No   Sig: Use as directed on package   montelukast (SINGULAIR) 10 mg tablet   Yes No   Sig: TAKE 1 TABLET (10 MG) BY MOUTH DAILY.   vitamin B-12 (VITAMIN B-12) 1,000 mcg tablet  Self Yes No   Sig: Take 1,000 mcg by mouth      Facility-Administered Medications: None     Discharge Medication List as of 4/30/2025  4:08 AM        START taking these medications    Details    methocarbamol (ROBAXIN) 500 mg tablet Take 1 tablet (500 mg total) by mouth 2 (two) times a day, Starting Wed 4/30/2025, Normal           CONTINUE these medications which have NOT CHANGED    Details   albuterol (PROVENTIL HFA,VENTOLIN HFA) 90 mcg/act inhaler Inhale 2 puffs every 6 (six) hours as needed for wheezing, Historical Med      Ascorbic Acid (Vitamin C) 500 MG CAPS Take by mouth daily, Historical Med      cetirizine (ZyrTEC) 10 mg tablet daily, Historical Med      cholecalciferol (VITAMIN D3) 1,000 units tablet Take 1,000 Units by mouth daily, Historical Med      EPINEPHrine (EPIPEN) 0.3 mg/0.3 mL SOAJ Inject 0.3 mg into a muscle, Starting Mon 3/10/2025, Historical Med      ibuprofen (MOTRIN) 200 mg tablet Take 200-800 mg by mouth every 6 (six) hours as needed for mild pain, Historical Med      MAGNESIUM-OXIDE PO Take by mouth daily, Historical Med      methylPREDNISolone 4 MG tablet therapy pack Use as directed on package, Normal      montelukast (SINGULAIR) 10 mg tablet TAKE 1 TABLET (10 MG) BY MOUTH DAILY., Historical Med      Omega 3-6-9 Fatty Acids (OMEGA 3-6-9 COMPLEX PO) Take by mouth in the morning, Historical Med      vitamin B-12 (VITAMIN B-12) 1,000 mcg tablet Take 1,000 mcg by mouth, Historical Med           No discharge procedures on file.  ED SEPSIS DOCUMENTATION   Time reflects when diagnosis was documented in both MDM as applicable and the Disposition within this note       Time User Action Codes Description Comment    4/30/2025  4:06 AM Bridget Fernández [R51.9] Headache     4/30/2025  4:06 AM Bridget Fernández [R79.89] Elevated TSH     4/30/2025  4:07 AM Bridget Fernández [R19.7] Diarrhea                  Bridget Fernández PA-C  04/30/25 0609

## 2025-04-30 NOTE — DISCHARGE INSTRUCTIONS
Follow up with your PCP as soon as possible for symptoms, and elevated TSH    Return for recurrent severe headache, or for any chest pain, trouble breathing, neck stiffness, vision changes, or any other new/concerning symptoms     Take Robaxin as prescribed, as needed for moderate pain- do not drive, operate heavy machinery, or combine with alcohol/any other sedative medication after taking

## 2025-05-06 ENCOUNTER — OFFICE VISIT (OUTPATIENT)
Age: 50
End: 2025-05-06
Payer: COMMERCIAL

## 2025-05-06 VITALS
HEIGHT: 65 IN | OXYGEN SATURATION: 98 % | SYSTOLIC BLOOD PRESSURE: 108 MMHG | WEIGHT: 160.4 LBS | BODY MASS INDEX: 26.73 KG/M2 | TEMPERATURE: 97.5 F | DIASTOLIC BLOOD PRESSURE: 76 MMHG | HEART RATE: 72 BPM

## 2025-05-06 DIAGNOSIS — R79.89 ABNORMAL TSH: ICD-10-CM

## 2025-05-06 DIAGNOSIS — E03.9 HYPOTHYROIDISM, UNSPECIFIED TYPE: ICD-10-CM

## 2025-05-06 DIAGNOSIS — G43.809 OTHER MIGRAINE WITHOUT STATUS MIGRAINOSUS, NOT INTRACTABLE: ICD-10-CM

## 2025-05-06 DIAGNOSIS — Z13.228 SCREENING FOR METABOLIC DISORDER: Primary | ICD-10-CM

## 2025-05-06 DIAGNOSIS — E78.5 HYPERLIPIDEMIA, UNSPECIFIED HYPERLIPIDEMIA TYPE: ICD-10-CM

## 2025-05-06 DIAGNOSIS — D72.829 LEUKOCYTOSIS, UNSPECIFIED TYPE: ICD-10-CM

## 2025-05-06 PROBLEM — G43.909 MIGRAINE: Status: ACTIVE | Noted: 2025-05-06

## 2025-05-06 PROCEDURE — 99214 OFFICE O/P EST MOD 30 MIN: CPT | Performed by: NURSE PRACTITIONER

## 2025-05-06 RX ORDER — SUMATRIPTAN SUCCINATE 25 MG/1
25 TABLET ORAL ONCE AS NEEDED
Qty: 10 TABLET | Refills: 0 | Status: SHIPPED | OUTPATIENT
Start: 2025-05-06

## 2025-05-06 NOTE — PROGRESS NOTES
"Name: Sharon Patel      : 1975      MRN: [id number]  Encounter Provider: JOSE Bass  Encounter Date: 2025   Encounter department: [facility]  :  Assessment & Plan  Screening for metabolic disorder    Orders:    CBC and differential    Comprehensive metabolic panel; Future    CBC and differential; Future    Lipid panel; Future    Abnormal TSH  - Repeat TSH in 6 weeks    Orders:    TSH, 3rd generation with Free T4 reflex    TSH, 3rd generation with Free T4 reflex; Future    Hypothyroidism, unspecified type    Orders:    TSH, 3rd generation with Free T4 reflex    TSH, 3rd generation with Free T4 reflex; Future    Hyperlipidemia, unspecified hyperlipidemia type    Orders:    Lipid panel; Future    Other migraine without status migrainosus, not intractable  -will start PRN Sumatriptan  - Discussed with patient that if headaches persist or become more frequent or worse she should return to office for evaluation  -Advised to stay well-hydrated and eat a well-balanced diet    Orders:    SUMAtriptan (Imitrex) 25 mg tablet; Take 1 tablet (25 mg total) by mouth once as needed for migraine for up to 1 dose    Leukocytosis, unspecified type  - Repeat CBC, likely secondary to steroid use/viral infection                History of Present Illness   Patient presents today for ER follow up, presented to the hospital on 429 with headache.  Medical Decision Making Dr. Richardson  \"DDx including but not limited to: tension headache, cluster headache, migraine, ICH, SAH, tumor, dissection, reversable cerebral vasoconstriction syndrome (RCVS), thyroid abnormality, electrolyte abnormality, anemia. Doubt meningitis, temporal arteritis, glaucoma, carbon monoxide poisoning.     Will obtain EKG, troponin to evaluate for ACS.  Will obtain CTA head and neck.  Will obtain CBC to evaluate for leukocytosis, anemia.  Will obtain CMP to evaluate kidney function, for electrolyte " "disturbance.      Mild leukocytosis. TSH mildly elevated; T4 sent. Initial labs otherwise without significant abnormality. Troponin 5,will obtain 2 hr.      CTA without acute findings. On re-examination, patient reports improvement, but not resolution of headache. Will trial robaxin.      Delta troponin 6. No EKG changes. Pt without CP. Will obtain 4 hr. Pt declines additional headache treatment at this time.     4 hr troponin WINL. Patient remains in no acute distress, no focal neurologic deficits. VSS, hypertension has resolved. Pt reports headache continues to feel improved. Will dc     At the time of discharge, the patient is in no acute distress. I discussed with the patient the diagnosis, treatment plan, follow-up, return precautions, and discharge instructions; they were given the opportunity to ask questions and verbalized understanding.\"    Had migraines in the past     Has not had a headaches for about a week, today has a mild headache    She does report seasonal allergies which she follows an allergist for    Denies any upper respiratory-like symptoms at this time    TSH mildly elevated at 4.742, T4 1.11, patient reports weight gain and fatigue, but otherwise asymptomatic    WBCs mildly elevated at 11.93, however this could be related to viral infection/prednisone use               Review of Systems   Constitutional:  Negative for activity change, appetite change, chills, diaphoresis and fever.   HENT:  Negative for congestion, ear discharge, ear pain, postnasal drip, rhinorrhea, sinus pressure, sinus pain and sore throat.    Eyes:  Negative for pain, discharge, itching and visual disturbance.   Respiratory:  Negative for cough, chest tightness, shortness of breath and wheezing.    Cardiovascular:  Negative for chest pain, palpitations and leg swelling.   Gastrointestinal:  Negative for abdominal pain, constipation, diarrhea, nausea and vomiting.   Endocrine: Negative for polydipsia, polyphagia and " "polyuria.   Genitourinary:  Negative for difficulty urinating, dysuria and urgency.   Musculoskeletal:  Negative for arthralgias, back pain and neck pain.   Skin:  Negative for rash and wound.   Neurological:  Positive for headaches. Negative for dizziness, weakness and numbness.       Objective   /76 (BP Location: Left arm, Patient Position: Sitting, Cuff Size: Standard)   Pulse 72   Temp 97.5 °F (36.4 °C) (Temporal)   Ht 5' 5\" (1.651 m)   Wt 72.8 kg (160 lb 6.4 oz)   SpO2 98%   BMI 26.69 kg/m²      Physical Exam  Constitutional:       General: She is not in acute distress.     Appearance: She is well-developed. She is not diaphoretic.   HENT:      Head: Normocephalic and atraumatic.      Right Ear: External ear normal.      Left Ear: External ear normal.      Nose: Nose normal.      Mouth/Throat:      Mouth: Mucous membranes are moist.      Pharynx: No oropharyngeal exudate or posterior oropharyngeal erythema.   Eyes:      General:         Right eye: No discharge.         Left eye: No discharge.      Conjunctiva/sclera: Conjunctivae normal.      Pupils: Pupils are equal, round, and reactive to light.   Neck:      Thyroid: No thyromegaly.   Cardiovascular:      Rate and Rhythm: Normal rate and regular rhythm.      Heart sounds: Normal heart sounds. No murmur heard.     No friction rub. No gallop.   Pulmonary:      Effort: Pulmonary effort is normal. No respiratory distress.      Breath sounds: Normal breath sounds. No stridor. No wheezing or rales.   Abdominal:      General: Bowel sounds are normal. There is no distension.      Palpations: Abdomen is soft.      Tenderness: There is no abdominal tenderness.   Musculoskeletal:      Cervical back: Normal range of motion and neck supple.   Lymphadenopathy:      Cervical: No cervical adenopathy.   Skin:     General: Skin is warm and dry.      Findings: No erythema or rash.   Neurological:      Mental Status: She is alert and oriented to person, place, and " time.   Psychiatric:         Behavior: Behavior normal.         Thought Content: Thought content normal.         Judgment: Judgment normal.       Administrative Statements   I have spent a total time of 30 minutes in caring for this patient on the day of the visit/encounter including Diagnostic results, Risks and benefits of tx options, Importance of tx compliance, Counseling / Coordination of care, Documenting in the medical record, Reviewing/placing orders in the medical record (including tests, medications, and/or procedures), and Obtaining or reviewing history  .

## 2025-05-06 NOTE — ASSESSMENT & PLAN NOTE
- Repeat TSH in 6 weeks    Orders:    TSH, 3rd generation with Free T4 reflex    TSH, 3rd generation with Free T4 reflex; Future

## 2025-05-06 NOTE — ASSESSMENT & PLAN NOTE
-will start PRN Sumatriptan  - Discussed with patient that if headaches persist or become more frequent or worse she should return to office for evaluation  -Advised to stay well-hydrated and eat a well-balanced diet    Orders:    SUMAtriptan (Imitrex) 25 mg tablet; Take 1 tablet (25 mg total) by mouth once as needed for migraine for up to 1 dose

## 2025-06-11 ENCOUNTER — PATIENT MESSAGE (OUTPATIENT)
Age: 50
End: 2025-06-11

## 2025-06-17 ENCOUNTER — APPOINTMENT (OUTPATIENT)
Dept: LAB | Facility: HOSPITAL | Age: 50
End: 2025-06-17
Payer: COMMERCIAL

## 2025-06-17 DIAGNOSIS — Z13.228 SCREENING FOR METABOLIC DISORDER: ICD-10-CM

## 2025-06-17 LAB
BASOPHILS # BLD AUTO: 0.03 THOUSANDS/ÂΜL (ref 0–0.1)
BASOPHILS NFR BLD AUTO: 1 % (ref 0–1)
EOSINOPHIL # BLD AUTO: 0.09 THOUSAND/ÂΜL (ref 0–0.61)
EOSINOPHIL NFR BLD AUTO: 1 % (ref 0–6)
ERYTHROCYTE [DISTWIDTH] IN BLOOD BY AUTOMATED COUNT: 13.2 % (ref 11.6–15.1)
HCT VFR BLD AUTO: 35.8 % (ref 34.8–46.1)
HGB BLD-MCNC: 12 G/DL (ref 11.5–15.4)
IMM GRANULOCYTES # BLD AUTO: 0.01 THOUSAND/UL (ref 0–0.2)
IMM GRANULOCYTES NFR BLD AUTO: 0 % (ref 0–2)
LYMPHOCYTES # BLD AUTO: 1.63 THOUSANDS/ÂΜL (ref 0.6–4.47)
LYMPHOCYTES NFR BLD AUTO: 26 % (ref 14–44)
MCH RBC QN AUTO: 32.2 PG (ref 26.8–34.3)
MCHC RBC AUTO-ENTMCNC: 33.5 G/DL (ref 31.4–37.4)
MCV RBC AUTO: 96 FL (ref 82–98)
MONOCYTES # BLD AUTO: 0.4 THOUSAND/ÂΜL (ref 0.17–1.22)
MONOCYTES NFR BLD AUTO: 6 % (ref 4–12)
NEUTROPHILS # BLD AUTO: 4.2 THOUSANDS/ÂΜL (ref 1.85–7.62)
NEUTS SEG NFR BLD AUTO: 66 % (ref 43–75)
NRBC BLD AUTO-RTO: 0 /100 WBCS
PLATELET # BLD AUTO: 301 THOUSANDS/UL (ref 149–390)
PMV BLD AUTO: 9.9 FL (ref 8.9–12.7)
RBC # BLD AUTO: 3.73 MILLION/UL (ref 3.81–5.12)
TSH SERPL DL<=0.05 MIU/L-ACNC: 2.04 UIU/ML (ref 0.45–4.5)
WBC # BLD AUTO: 6.36 THOUSAND/UL (ref 4.31–10.16)

## 2025-06-18 ENCOUNTER — RESULTS FOLLOW-UP (OUTPATIENT)
Dept: INTERNAL MEDICINE CLINIC | Facility: OTHER | Age: 50
End: 2025-06-18

## 2025-06-18 ENCOUNTER — HOSPITAL ENCOUNTER (OUTPATIENT)
Dept: RADIOLOGY | Facility: IMAGING CENTER | Age: 50
Discharge: HOME | End: 2025-06-18
Payer: COMMERCIAL

## 2025-06-18 VITALS — WEIGHT: 159 LBS | HEIGHT: 64 IN | BODY MASS INDEX: 27.14 KG/M2

## 2025-06-18 DIAGNOSIS — Z12.31 ENCOUNTER FOR SCREENING MAMMOGRAM FOR BREAST CANCER: ICD-10-CM

## 2025-06-18 PROCEDURE — 77067 SCR MAMMO BI INCL CAD: CPT

## 2025-06-18 PROCEDURE — 77063 BREAST TOMOSYNTHESIS BI: CPT

## 2025-07-09 ENCOUNTER — PATIENT MESSAGE (OUTPATIENT)
Age: 50
End: 2025-07-09

## 2025-07-09 NOTE — PATIENT COMMUNICATION
Pt called back , she checked with GYN provider and was told that PCP is treating the migraines so the labs would need to be ordered by PCP . Pt also noticed that so far her migraines are aligning with her period that's why she wanted to get her hormone levels checked.  Please advise of pcp recommendations

## 2025-07-14 DIAGNOSIS — G43.809 OTHER MIGRAINE WITHOUT STATUS MIGRAINOSUS, NOT INTRACTABLE: Primary | ICD-10-CM

## 2025-07-25 ENCOUNTER — APPOINTMENT (OUTPATIENT)
Dept: LAB | Facility: HOSPITAL | Age: 50
End: 2025-07-25
Payer: COMMERCIAL

## 2025-07-25 ENCOUNTER — HOSPITAL ENCOUNTER (OUTPATIENT)
Dept: ULTRASOUND IMAGING | Facility: CLINIC | Age: 50
Discharge: HOME | End: 2025-07-25
Attending: INTERNAL MEDICINE
Payer: COMMERCIAL

## 2025-07-25 ENCOUNTER — HOSPITAL ENCOUNTER (OUTPATIENT)
Dept: MAMMOGRAPHY | Facility: CLINIC | Age: 50
Discharge: HOME | End: 2025-07-25
Attending: INTERNAL MEDICINE
Payer: COMMERCIAL

## 2025-07-25 DIAGNOSIS — R79.89 ABNORMAL TSH: ICD-10-CM

## 2025-07-25 DIAGNOSIS — E78.5 HYPERLIPIDEMIA, UNSPECIFIED HYPERLIPIDEMIA TYPE: ICD-10-CM

## 2025-07-25 DIAGNOSIS — R92.8 ABNORMAL MAMMOGRAM: ICD-10-CM

## 2025-07-25 DIAGNOSIS — E03.9 HYPOTHYROIDISM, UNSPECIFIED TYPE: ICD-10-CM

## 2025-07-25 DIAGNOSIS — Z13.228 SCREENING FOR METABOLIC DISORDER: ICD-10-CM

## 2025-07-25 LAB
ALBUMIN SERPL BCG-MCNC: 4.3 G/DL (ref 3.5–5)
ALP SERPL-CCNC: 55 U/L (ref 34–104)
ALT SERPL W P-5'-P-CCNC: 11 U/L (ref 7–52)
ANION GAP SERPL CALCULATED.3IONS-SCNC: 4 MMOL/L (ref 4–13)
AST SERPL W P-5'-P-CCNC: 12 U/L (ref 13–39)
BASOPHILS # BLD AUTO: 0.04 THOUSANDS/ÂΜL (ref 0–0.1)
BASOPHILS NFR BLD AUTO: 1 % (ref 0–1)
BILIRUB SERPL-MCNC: 0.39 MG/DL (ref 0.2–1)
BUN SERPL-MCNC: 11 MG/DL (ref 5–25)
CALCIUM SERPL-MCNC: 9 MG/DL (ref 8.4–10.2)
CHLORIDE SERPL-SCNC: 108 MMOL/L (ref 96–108)
CHOLEST SERPL-MCNC: 189 MG/DL (ref ?–200)
CO2 SERPL-SCNC: 26 MMOL/L (ref 21–32)
CREAT SERPL-MCNC: 0.69 MG/DL (ref 0.6–1.3)
EOSINOPHIL # BLD AUTO: 0.08 THOUSAND/ÂΜL (ref 0–0.61)
EOSINOPHIL NFR BLD AUTO: 1 % (ref 0–6)
ERYTHROCYTE [DISTWIDTH] IN BLOOD BY AUTOMATED COUNT: 12.8 % (ref 11.6–15.1)
FSH SERPL-ACNC: 3.3 MIU/ML
GFR SERPL CREATININE-BSD FRML MDRD: 103 ML/MIN/1.73SQ M
GLUCOSE P FAST SERPL-MCNC: 97 MG/DL (ref 65–99)
HCT VFR BLD AUTO: 37.4 % (ref 34.8–46.1)
HDLC SERPL-MCNC: 59 MG/DL
HGB BLD-MCNC: 12.8 G/DL (ref 11.5–15.4)
IMM GRANULOCYTES # BLD AUTO: 0.02 THOUSAND/UL (ref 0–0.2)
IMM GRANULOCYTES NFR BLD AUTO: 0 % (ref 0–2)
LDLC SERPL CALC-MCNC: 117 MG/DL (ref 0–100)
LH SERPL-ACNC: 8.1 MIU/ML
LYMPHOCYTES # BLD AUTO: 1.73 THOUSANDS/ÂΜL (ref 0.6–4.47)
LYMPHOCYTES NFR BLD AUTO: 26 % (ref 14–44)
MCH RBC QN AUTO: 32.2 PG (ref 26.8–34.3)
MCHC RBC AUTO-ENTMCNC: 34.2 G/DL (ref 31.4–37.4)
MCV RBC AUTO: 94 FL (ref 82–98)
MONOCYTES # BLD AUTO: 0.58 THOUSAND/ÂΜL (ref 0.17–1.22)
MONOCYTES NFR BLD AUTO: 9 % (ref 4–12)
NEUTROPHILS # BLD AUTO: 4.23 THOUSANDS/ÂΜL (ref 1.85–7.62)
NEUTS SEG NFR BLD AUTO: 63 % (ref 43–75)
NONHDLC SERPL-MCNC: 130 MG/DL
NRBC BLD AUTO-RTO: 0 /100 WBCS
PLATELET # BLD AUTO: 350 THOUSANDS/UL (ref 149–390)
PMV BLD AUTO: 9.5 FL (ref 8.9–12.7)
POTASSIUM SERPL-SCNC: 4.2 MMOL/L (ref 3.5–5.3)
PROGEST SERPL-MCNC: 14.09 NG/ML
PROT SERPL-MCNC: 6.4 G/DL (ref 6.4–8.4)
RBC # BLD AUTO: 3.97 MILLION/UL (ref 3.81–5.12)
SODIUM SERPL-SCNC: 138 MMOL/L (ref 135–147)
TESTOST SERPL-MSCNC: 48 NG/DL (ref 0–75)
TRIGL SERPL-MCNC: 66 MG/DL (ref ?–150)
TSH SERPL DL<=0.05 MIU/L-ACNC: 1.93 UIU/ML (ref 0.45–4.5)
WBC # BLD AUTO: 6.68 THOUSAND/UL (ref 4.31–10.16)

## 2025-07-25 PROCEDURE — 77066 DX MAMMO INCL CAD BI: CPT

## 2025-07-25 PROCEDURE — 82672 ASSAY OF ESTROGEN: CPT | Performed by: NURSE PRACTITIONER

## 2025-07-25 PROCEDURE — 83001 ASSAY OF GONADOTROPIN (FSH): CPT

## 2025-07-25 PROCEDURE — 76642 ULTRASOUND BREAST LIMITED: CPT

## 2025-07-25 PROCEDURE — 80053 COMPREHEN METABOLIC PANEL: CPT

## 2025-07-25 PROCEDURE — 84443 ASSAY THYROID STIM HORMONE: CPT

## 2025-07-25 PROCEDURE — 80061 LIPID PANEL: CPT

## 2025-07-25 PROCEDURE — 36415 COLL VENOUS BLD VENIPUNCTURE: CPT | Performed by: NURSE PRACTITIONER

## 2025-07-25 PROCEDURE — 83002 ASSAY OF GONADOTROPIN (LH): CPT

## 2025-07-25 PROCEDURE — 85025 COMPLETE CBC W/AUTO DIFF WBC: CPT

## 2025-07-25 PROCEDURE — 84403 ASSAY OF TOTAL TESTOSTERONE: CPT | Performed by: NURSE PRACTITIONER

## 2025-07-25 PROCEDURE — 84144 ASSAY OF PROGESTERONE: CPT | Performed by: NURSE PRACTITIONER

## 2025-07-25 PROCEDURE — G0279 TOMOSYNTHESIS, MAMMO: HCPCS

## 2025-07-29 LAB — ESTROGEN SERPL-MCNC: 220 PG/ML

## 2025-08-08 ENCOUNTER — OFFICE VISIT (OUTPATIENT)
Age: 50
End: 2025-08-08
Payer: COMMERCIAL

## 2025-08-08 VITALS
BODY MASS INDEX: 27.28 KG/M2 | HEART RATE: 86 BPM | DIASTOLIC BLOOD PRESSURE: 74 MMHG | TEMPERATURE: 98 F | HEIGHT: 64 IN | SYSTOLIC BLOOD PRESSURE: 116 MMHG | WEIGHT: 159.8 LBS | OXYGEN SATURATION: 99 %

## 2025-08-08 DIAGNOSIS — G43.809 OTHER MIGRAINE WITHOUT STATUS MIGRAINOSUS, NOT INTRACTABLE: Primary | ICD-10-CM

## 2025-08-08 PROCEDURE — 99213 OFFICE O/P EST LOW 20 MIN: CPT | Performed by: NURSE PRACTITIONER

## 2025-08-12 ENCOUNTER — APPOINTMENT (OUTPATIENT)
Dept: LAB | Facility: IMAGING CENTER | Age: 50
End: 2025-08-12
Payer: COMMERCIAL

## (undated) DEVICE — STERILE 8 INCH PROCTO SWAB: Brand: CARDINAL HEALTH

## (undated) DEVICE — SPONGE 4 X 4 XRAY 16 PLY STRL LF RFD

## (undated) DEVICE — PVC URETHRAL CATHETER: Brand: DOVER

## (undated) DEVICE — 2000CC GUARDIAN II: Brand: GUARDIAN

## (undated) DEVICE — GLOVE INDICATOR PI UNDERGLOVE SZ 7 BLUE

## (undated) DEVICE — PACK FLUENT DISP

## (undated) DEVICE — GLOVE PI ULTRA TOUCH SZ.7.0

## (undated) DEVICE — PREMIUM DRY TRAY LF: Brand: MEDLINE INDUSTRIES, INC.

## (undated) DEVICE — DEVICE MYOSURE TISSUE REMOVAL HYSTEROSCOPIC

## (undated) DEVICE — GLOVE PI ULTRA TOUCH SZ.6.5

## (undated) DEVICE — CYSTO TUBING SINGLE IRRIGATION

## (undated) DEVICE — STRL ALLENTOWN HYSTEROSCOPY PK: Brand: CARDINAL HEALTH

## (undated) DEVICE — SCD SEQUENTIAL COMPRESSION COMFORT SLEEVE MEDIUM KNEE LENGTH: Brand: KENDALL SCD

## (undated) DEVICE — DRAPE EQUIPMENT RF WAND

## (undated) DEVICE — MYOSURE SEAL SET

## (undated) DEVICE — IV EXTENSION TUBING 33 IN

## (undated) DEVICE — UNDER BUTTOCKS DRAPE W/FLUID CONTROL POUCH: Brand: CONVERTORS